# Patient Record
Sex: MALE | Race: WHITE | NOT HISPANIC OR LATINO | URBAN - METROPOLITAN AREA
[De-identification: names, ages, dates, MRNs, and addresses within clinical notes are randomized per-mention and may not be internally consistent; named-entity substitution may affect disease eponyms.]

---

## 2022-04-20 ENCOUNTER — TELEPHONE (OUTPATIENT)
Dept: TRANSPLANT | Facility: CLINIC | Age: 57
End: 2022-04-20
Payer: MEDICAID

## 2022-04-20 NOTE — TELEPHONE ENCOUNTER
Referral received from Adam Duran    Patient with alcohol cirrhosis with ascites requesting TIPs.   MELD 11  ICD-10:  K74.60  Referred for liver transplant for  EVALUATION.    Referral completed and forwarded to [facility].          Insurance:   PRIMARY:   ID:  Contact #     SECONDARY:   ID:  Contact #

## 2022-05-12 ENCOUNTER — TELEPHONE (OUTPATIENT)
Dept: TRANSPLANT | Facility: CLINIC | Age: 57
End: 2022-05-12
Payer: MEDICAID

## 2022-05-12 DIAGNOSIS — K74.60 HEPATIC CIRRHOSIS, UNSPECIFIED HEPATIC CIRRHOSIS TYPE, UNSPECIFIED WHETHER ASCITES PRESENT: ICD-10-CM

## 2022-05-12 DIAGNOSIS — Z76.82 ORGAN TRANSPLANT CANDIDATE: ICD-10-CM

## 2022-05-12 NOTE — TELEPHONE ENCOUNTER
----- Message from Jada Alston MA sent at 5/12/2022  3:26 PM CDT -----    ----- Message -----  From: Alana Elias  Sent: 5/12/2022   2:18 PM CDT  To: [facility] Clinical Staff    Type:  Patient Returning Call    Who Called pt sister Linda   Who Left Message for Patient: pt   Does the patient know what this is regarding?: pt sister was call to see if someone can give her a call about an appt   Would the patient rather a call back or a response via [facility]?  Call   Best Call Back Number: [phone]  Additional Information: call back

## 2022-05-17 ENCOUNTER — HOSPITAL ENCOUNTER (OUTPATIENT)
Dept: INTERVENTIONAL RADIOLOGY/VASCULAR | Facility: HOSPITAL | Age: 57
Discharge: HOME | End: 2022-05-17
Attending: INTERNAL MEDICINE
Payer: MEDICAID

## 2022-05-17 ENCOUNTER — OFFICE VISIT (OUTPATIENT)
Dept: TRANSPLANT | Facility: CLINIC | Age: 57
End: 2022-05-17
Payer: MEDICAID

## 2022-05-17 ENCOUNTER — HOSPITAL ENCOUNTER (INPATIENT)
Facility: HOSPITAL | Age: 57
LOS: 6 days | Discharge: HOME | End: 2022-05-25
Attending: EMERGENCY MEDICINE | Admitting: STUDENT IN AN ORGANIZED HEALTH CARE EDUCATION/TRAINING PROGRAM
Payer: MEDICAID

## 2022-05-17 VITALS
DIASTOLIC BLOOD PRESSURE: 73 MMHG | OXYGEN SATURATION: 95 % | WEIGHT: 216.06 LBS | BODY MASS INDEX: 29.26 KG/M2 | HEIGHT: 72 IN | HEART RATE: 75 BPM | RESPIRATION RATE: 16 BRPM | TEMPERATURE: 97 F | SYSTOLIC BLOOD PRESSURE: 109 MMHG

## 2022-05-17 VITALS
HEART RATE: 66 BPM | RESPIRATION RATE: 21 BRPM | DIASTOLIC BLOOD PRESSURE: 66 MMHG | OXYGEN SATURATION: 97 % | SYSTOLIC BLOOD PRESSURE: 104 MMHG

## 2022-05-17 DIAGNOSIS — K70.31 ALCOHOLIC CIRRHOSIS OF LIVER WITH ASCITES: ICD-10-CM

## 2022-05-17 DIAGNOSIS — K70.31 ALCOHOLIC CIRRHOSIS OF LIVER WITH ASCITES: Primary | ICD-10-CM

## 2022-05-17 DIAGNOSIS — K74.60 HEPATIC CIRRHOSIS, UNSPECIFIED HEPATIC CIRRHOSIS TYPE, UNSPECIFIED WHETHER ASCITES PRESENT: ICD-10-CM

## 2022-05-17 DIAGNOSIS — F10.21 ALCOHOL USE DISORDER, SEVERE, IN SUSTAINED REMISSION: Chronic | ICD-10-CM

## 2022-05-17 DIAGNOSIS — Z01.818 PRE-TRANSPLANT EVALUATION FOR CHRONIC LIVER DISEASE: ICD-10-CM

## 2022-05-17 DIAGNOSIS — K70.31 ASCITES DUE TO ALCOHOLIC CIRRHOSIS: Primary | ICD-10-CM

## 2022-05-17 DIAGNOSIS — R07.9 CHEST PAIN: ICD-10-CM

## 2022-05-17 DIAGNOSIS — F19.90 IVDU (INTRAVENOUS DRUG USER): ICD-10-CM

## 2022-05-17 DIAGNOSIS — K70.31 ASCITES DUE TO ALCOHOLIC CIRRHOSIS: ICD-10-CM

## 2022-05-17 DIAGNOSIS — Z76.82 ORGAN TRANSPLANT CANDIDATE: ICD-10-CM

## 2022-05-17 DIAGNOSIS — N17.9 AKI (ACUTE KIDNEY INJURY): ICD-10-CM

## 2022-05-17 PROBLEM — I85.00 ESOPHAGEAL VARICES WITHOUT BLEEDING: Status: ACTIVE | Noted: 2022-05-17

## 2022-05-17 PROBLEM — Z96.0 URETERAL STENT RETAINED: Status: ACTIVE | Noted: 2022-05-17

## 2022-05-17 PROBLEM — I10 HTN (HYPERTENSION): Status: ACTIVE | Noted: 2019-08-12

## 2022-05-17 PROBLEM — B18.1 CHRONIC HEPATITIS B WITH CIRRHOSIS: Status: ACTIVE | Noted: 2019-06-05

## 2022-05-17 LAB
ALBUMIN SERPL BCP-MCNC: 2.4 G/DL (ref 3.5–5.2)
ALP SERPL-CCNC: 137 U/L (ref 55–135)
ALT SERPL W/O P-5'-P-CCNC: 32 U/L (ref 10–44)
AMMONIA PLAS-SCNC: 70 UMOL/L (ref 10–50)
ANION GAP SERPL CALC-SCNC: 9 MMOL/L (ref 8–16)
APPEARANCE FLD: NORMAL
APTT BLDCRRT: 27.1 SEC (ref 21–32)
AST SERPL-CCNC: 51 U/L (ref 10–40)
BASOPHILS # BLD AUTO: 0.06 K/UL (ref 0–0.2)
BASOPHILS NFR BLD: 0.7 % (ref 0–1.9)
BILIRUB SERPL-MCNC: 1.2 MG/DL (ref 0.1–1)
BODY FLD TYPE: NORMAL
BUN SERPL-MCNC: 36 MG/DL (ref 6–20)
CALCIUM SERPL-MCNC: 8.1 MG/DL (ref 8.7–10.5)
CHLORIDE SERPL-SCNC: 100 MMOL/L (ref 95–110)
CO2 SERPL-SCNC: 22 MMOL/L (ref 23–29)
COLOR FLD: NORMAL
CREAT SERPL-MCNC: 1.7 MG/DL (ref 0.5–1.4)
CTP QC/QA: YES
DIFFERENTIAL METHOD: ABNORMAL
EOSINOPHIL # BLD AUTO: 0.2 K/UL (ref 0–0.5)
EOSINOPHIL NFR BLD: 2 % (ref 0–8)
ERYTHROCYTE [DISTWIDTH] IN BLOOD BY AUTOMATED COUNT: 14.4 % (ref 11.5–14.5)
EST. GFR  (AFRICAN AMERICAN): 51.3 ML/MIN/1.73 M^2
EST. GFR  (NON AFRICAN AMERICAN): 44.4 ML/MIN/1.73 M^2
GLUCOSE SERPL-MCNC: 174 MG/DL (ref 70–110)
HCT VFR BLD AUTO: 39.4 % (ref 40–54)
HGB BLD-MCNC: 13.2 G/DL (ref 14–18)
IMM GRANULOCYTES # BLD AUTO: 0.17 K/UL (ref 0–0.04)
IMM GRANULOCYTES NFR BLD AUTO: 2 % (ref 0–0.5)
INR PPP: 1.3 (ref 0.8–1.2)
LYMPHOCYTES # BLD AUTO: 1.2 K/UL (ref 1–4.8)
LYMPHOCYTES NFR BLD: 13.9 % (ref 18–48)
LYMPHOCYTES NFR FLD MANUAL: 61 %
MCH RBC QN AUTO: 30.1 PG (ref 27–31)
MCHC RBC AUTO-ENTMCNC: 33.5 G/DL (ref 32–36)
MCV RBC AUTO: 90 FL (ref 82–98)
MESOTHL CELL NFR FLD MANUAL: 3 %
MONOCYTES # BLD AUTO: 1.4 K/UL (ref 0.3–1)
MONOCYTES NFR BLD: 16.5 % (ref 4–15)
MONOS+MACROS NFR FLD MANUAL: 6 %
NEUTROPHILS # BLD AUTO: 5.6 K/UL (ref 1.8–7.7)
NEUTROPHILS NFR BLD: 64.9 % (ref 38–73)
NEUTROPHILS NFR FLD MANUAL: 30 %
NRBC BLD-RTO: 0 /100 WBC
PLATELET # BLD AUTO: 132 K/UL (ref 150–450)
PMV BLD AUTO: 10.3 FL (ref 9.2–12.9)
POTASSIUM SERPL-SCNC: 3.8 MMOL/L (ref 3.5–5.1)
PROT SERPL-MCNC: 6.1 G/DL (ref 6–8.4)
PROTHROMBIN TIME: 13 SEC (ref 9–12.5)
RBC # BLD AUTO: 4.38 M/UL (ref 4.6–6.2)
SARS-COV-2 RDRP RESP QL NAA+PROBE: NEGATIVE
SODIUM SERPL-SCNC: 131 MMOL/L (ref 136–145)
WBC # BLD AUTO: 8.55 K/UL (ref 3.9–12.7)
WBC # FLD: 292 /CU MM

## 2022-05-17 PROCEDURE — 3078F DIAST BP <80 MM HG: CPT | Mod: CPTII,TXP,, | Performed by: INTERNAL MEDICINE

## 2022-05-17 PROCEDURE — 25000003 PHARM REV CODE 250: Mod: NTX | Performed by: STUDENT IN AN ORGANIZED HEALTH CARE EDUCATION/TRAINING PROGRAM

## 2022-05-17 PROCEDURE — 96365 THER/PROPH/DIAG IV INF INIT: CPT

## 2022-05-17 PROCEDURE — 99220 PR INITIAL OBSERVATION CARE,LEVL III: ICD-10-PCS | Mod: NTX,,, | Performed by: HOSPITALIST

## 2022-05-17 PROCEDURE — 89051 BODY FLUID CELL COUNT: CPT | Mod: TXP | Performed by: INTERNAL MEDICINE

## 2022-05-17 PROCEDURE — 87389 HIV-1 AG W/HIV-1&-2 AB AG IA: CPT | Mod: NTX | Performed by: EMERGENCY MEDICINE

## 2022-05-17 PROCEDURE — G0378 HOSPITAL OBSERVATION PER HR: HCPCS | Mod: NTX

## 2022-05-17 PROCEDURE — 3008F BODY MASS INDEX DOCD: CPT | Mod: CPTII,TXP,, | Performed by: INTERNAL MEDICINE

## 2022-05-17 PROCEDURE — 87070 CULTURE OTHR SPECIMN AEROBIC: CPT | Mod: NTX | Performed by: INTERNAL MEDICINE

## 2022-05-17 PROCEDURE — 49083 ABD PARACENTESIS W/IMAGING: CPT | Mod: TXP | Performed by: RADIOLOGY

## 2022-05-17 PROCEDURE — 85610 PROTHROMBIN TIME: CPT | Mod: 91,NTX | Performed by: NURSE PRACTITIONER

## 2022-05-17 PROCEDURE — 63600175 PHARM REV CODE 636 W HCPCS: Mod: JG,UD,TXP | Performed by: PHYSICIAN ASSISTANT

## 2022-05-17 PROCEDURE — 82140 ASSAY OF AMMONIA: CPT | Mod: NTX | Performed by: NURSE PRACTITIONER

## 2022-05-17 PROCEDURE — 85025 COMPLETE CBC W/AUTO DIFF WBC: CPT | Mod: 91,NTX | Performed by: NURSE PRACTITIONER

## 2022-05-17 PROCEDURE — 80053 COMPREHEN METABOLIC PANEL: CPT | Mod: 91,NTX | Performed by: NURSE PRACTITIONER

## 2022-05-17 PROCEDURE — 85730 THROMBOPLASTIN TIME PARTIAL: CPT | Mod: NTX | Performed by: NURSE PRACTITIONER

## 2022-05-17 PROCEDURE — 99285 PR EMERGENCY DEPT VISIT,LEVEL V: ICD-10-PCS | Mod: CS,,, | Performed by: NURSE PRACTITIONER

## 2022-05-17 PROCEDURE — 25000003 PHARM REV CODE 250: Mod: NTX | Performed by: PHYSICIAN ASSISTANT

## 2022-05-17 PROCEDURE — 96367 TX/PROPH/DG ADDL SEQ IV INF: CPT

## 2022-05-17 PROCEDURE — 99999 PR PBB SHADOW E&M-EST. PATIENT-LVL III: ICD-10-PCS | Mod: PBBFAC,TXP,, | Performed by: INTERNAL MEDICINE

## 2022-05-17 PROCEDURE — 3078F PR MOST RECENT DIASTOLIC BLOOD PRESSURE < 80 MM HG: ICD-10-PCS | Mod: CPTII,TXP,, | Performed by: INTERNAL MEDICINE

## 2022-05-17 PROCEDURE — 99285 EMERGENCY DEPT VISIT HI MDM: CPT | Mod: 25,27

## 2022-05-17 PROCEDURE — 99285 EMERGENCY DEPT VISIT HI MDM: CPT | Mod: CS,,, | Performed by: NURSE PRACTITIONER

## 2022-05-17 PROCEDURE — C1729 CATH, DRAINAGE: HCPCS | Mod: NTX

## 2022-05-17 PROCEDURE — 3008F PR BODY MASS INDEX (BMI) DOCUMENTED: ICD-10-PCS | Mod: CPTII,TXP,, | Performed by: INTERNAL MEDICINE

## 2022-05-17 PROCEDURE — 99213 OFFICE O/P EST LOW 20 MIN: CPT | Mod: PBBFAC,TXP,25 | Performed by: INTERNAL MEDICINE

## 2022-05-17 PROCEDURE — 99205 PR OFFICE/OUTPT VISIT, NEW, LEVL V, 60-74 MIN: ICD-10-PCS | Mod: S$PBB,TXP,, | Performed by: INTERNAL MEDICINE

## 2022-05-17 PROCEDURE — P9047 ALBUMIN (HUMAN), 25%, 50ML: HCPCS | Mod: JG,UD,NTX

## 2022-05-17 PROCEDURE — 3074F PR MOST RECENT SYSTOLIC BLOOD PRESSURE < 130 MM HG: ICD-10-PCS | Mod: CPTII,TXP,, | Performed by: INTERNAL MEDICINE

## 2022-05-17 PROCEDURE — 96372 THER/PROPH/DIAG INJ SC/IM: CPT

## 2022-05-17 PROCEDURE — 99999 PR PBB SHADOW E&M-EST. PATIENT-LVL III: CPT | Mod: PBBFAC,TXP,, | Performed by: INTERNAL MEDICINE

## 2022-05-17 PROCEDURE — 3074F SYST BP LT 130 MM HG: CPT | Mod: CPTII,TXP,, | Performed by: INTERNAL MEDICINE

## 2022-05-17 PROCEDURE — U0002 COVID-19 LAB TEST NON-CDC: HCPCS | Mod: NTX | Performed by: NURSE PRACTITIONER

## 2022-05-17 PROCEDURE — P9047 ALBUMIN (HUMAN), 25%, 50ML: HCPCS | Mod: JG,UD,TXP | Performed by: PHYSICIAN ASSISTANT

## 2022-05-17 PROCEDURE — 63600175 PHARM REV CODE 636 W HCPCS: Mod: JG,UD,NTX

## 2022-05-17 PROCEDURE — 87075 CULTR BACTERIA EXCEPT BLOOD: CPT | Mod: NTX | Performed by: INTERNAL MEDICINE

## 2022-05-17 PROCEDURE — 25000003 PHARM REV CODE 250: Mod: NTX

## 2022-05-17 PROCEDURE — 99205 OFFICE O/P NEW HI 60 MIN: CPT | Mod: S$PBB,TXP,, | Performed by: INTERNAL MEDICINE

## 2022-05-17 PROCEDURE — 49083 ABD PARACENTESIS W/IMAGING: CPT | Mod: TXP,,, | Performed by: PHYSICIAN ASSISTANT

## 2022-05-17 PROCEDURE — 49083 IR PARACENTESIS WITH IMAGING: ICD-10-PCS | Mod: TXP,,, | Performed by: PHYSICIAN ASSISTANT

## 2022-05-17 PROCEDURE — 99220 PR INITIAL OBSERVATION CARE,LEVL III: CPT | Mod: NTX,,, | Performed by: HOSPITALIST

## 2022-05-17 RX ORDER — ALBUMIN HUMAN 250 G/1000ML
25 SOLUTION INTRAVENOUS ONCE
Status: DISCONTINUED | OUTPATIENT
Start: 2022-05-17 | End: 2022-05-17

## 2022-05-17 RX ORDER — FLUTICASONE FUROATE AND VILANTEROL 200; 25 UG/1; UG/1
1 POWDER RESPIRATORY (INHALATION) DAILY
COMMUNITY
Start: 2022-05-12

## 2022-05-17 RX ORDER — METHOCARBAMOL 500 MG/1
500 TABLET, FILM COATED ORAL ONCE
Status: COMPLETED | OUTPATIENT
Start: 2022-05-17 | End: 2022-05-17

## 2022-05-17 RX ORDER — SODIUM CHLORIDE 0.9 % (FLUSH) 0.9 %
10 SYRINGE (ML) INJECTION EVERY 12 HOURS PRN
Status: DISCONTINUED | OUTPATIENT
Start: 2022-05-17 | End: 2022-05-25 | Stop reason: HOSPADM

## 2022-05-17 RX ORDER — LACTULOSE 10 G/15ML
15 SOLUTION ORAL 3 TIMES DAILY
Status: DISCONTINUED | OUTPATIENT
Start: 2022-05-17 | End: 2022-05-18

## 2022-05-17 RX ORDER — TENOFOVIR DISOPROXIL FUMARATE 300 MG/1
300 TABLET, FILM COATED ORAL
COMMUNITY
Start: 2022-01-28 | End: 2023-01-28

## 2022-05-17 RX ORDER — LACTULOSE 10 G/15ML
30 SOLUTION ORAL; RECTAL 2 TIMES DAILY
COMMUNITY
Start: 2022-05-12

## 2022-05-17 RX ORDER — ALBUMIN HUMAN 250 G/1000ML
SOLUTION INTRAVENOUS
Status: COMPLETED | OUTPATIENT
Start: 2022-05-17 | End: 2022-05-17

## 2022-05-17 RX ORDER — MIDODRINE HYDROCHLORIDE 5 MG/1
10 TABLET ORAL
Status: DISCONTINUED | OUTPATIENT
Start: 2022-05-18 | End: 2022-05-18

## 2022-05-17 RX ORDER — IBUPROFEN 200 MG
24 TABLET ORAL
Status: DISCONTINUED | OUTPATIENT
Start: 2022-05-17 | End: 2022-05-25 | Stop reason: HOSPADM

## 2022-05-17 RX ORDER — TALC
6 POWDER (GRAM) TOPICAL NIGHTLY PRN
Status: CANCELLED | OUTPATIENT
Start: 2022-05-17

## 2022-05-17 RX ORDER — IBUPROFEN 200 MG
16 TABLET ORAL
Status: DISCONTINUED | OUTPATIENT
Start: 2022-05-17 | End: 2022-05-25 | Stop reason: HOSPADM

## 2022-05-17 RX ORDER — ALBUMIN HUMAN 250 G/1000ML
100 SOLUTION INTRAVENOUS ONCE
Status: DISCONTINUED | OUTPATIENT
Start: 2022-05-17 | End: 2022-05-17

## 2022-05-17 RX ORDER — ALBUMIN HUMAN 250 G/1000ML
SOLUTION INTRAVENOUS
Status: DISCONTINUED
Start: 2022-05-17 | End: 2022-05-17 | Stop reason: HOSPADM

## 2022-05-17 RX ORDER — MIDODRINE HYDROCHLORIDE 5 MG/1
5 TABLET ORAL
Status: DISCONTINUED | OUTPATIENT
Start: 2022-05-17 | End: 2022-05-17

## 2022-05-17 RX ORDER — LIDOCAINE HYDROCHLORIDE 10 MG/ML
INJECTION INFILTRATION; PERINEURAL CODE/TRAUMA/SEDATION MEDICATION
Status: COMPLETED | OUTPATIENT
Start: 2022-05-17 | End: 2022-05-17

## 2022-05-17 RX ORDER — OCTREOTIDE ACETATE 100 UG/ML
100 INJECTION, SOLUTION INTRAVENOUS; SUBCUTANEOUS EVERY 8 HOURS
Status: DISCONTINUED | OUTPATIENT
Start: 2022-05-17 | End: 2022-05-18

## 2022-05-17 RX ORDER — GLUCAGON 1 MG
1 KIT INJECTION
Status: DISCONTINUED | OUTPATIENT
Start: 2022-05-17 | End: 2022-05-25 | Stop reason: HOSPADM

## 2022-05-17 RX ORDER — FLUTICASONE FUROATE AND VILANTEROL 100; 25 UG/1; UG/1
1 POWDER RESPIRATORY (INHALATION) DAILY
Status: DISCONTINUED | OUTPATIENT
Start: 2022-05-18 | End: 2022-05-18

## 2022-05-17 RX ORDER — ALBUMIN HUMAN 250 G/1000ML
75 SOLUTION INTRAVENOUS ONCE
Status: COMPLETED | OUTPATIENT
Start: 2022-05-17 | End: 2022-05-17

## 2022-05-17 RX ORDER — OCTREOTIDE ACETATE 100 UG/ML
50 INJECTION, SOLUTION INTRAVENOUS; SUBCUTANEOUS EVERY 12 HOURS
Status: DISCONTINUED | OUTPATIENT
Start: 2022-05-17 | End: 2022-05-17

## 2022-05-17 RX ORDER — TENOFOVIR DISOPROXIL FUMARATE 300 MG/1
300 TABLET, FILM COATED ORAL DAILY
Status: DISCONTINUED | OUTPATIENT
Start: 2022-05-18 | End: 2022-05-25 | Stop reason: HOSPADM

## 2022-05-17 RX ORDER — NALOXONE HCL 0.4 MG/ML
0.02 VIAL (ML) INJECTION
Status: DISCONTINUED | OUTPATIENT
Start: 2022-05-17 | End: 2022-05-25 | Stop reason: HOSPADM

## 2022-05-17 RX ORDER — PANTOPRAZOLE SODIUM 40 MG/1
40 TABLET, DELAYED RELEASE ORAL DAILY
Status: DISCONTINUED | OUTPATIENT
Start: 2022-05-18 | End: 2022-05-25 | Stop reason: HOSPADM

## 2022-05-17 RX ORDER — SODIUM CHLORIDE 0.9 % (FLUSH) 0.9 %
10 SYRINGE (ML) INJECTION
Status: CANCELLED | OUTPATIENT
Start: 2022-05-17

## 2022-05-17 RX ADMIN — ALBUMIN (HUMAN) 25 G: 25 SOLUTION INTRAVENOUS at 11:05

## 2022-05-17 RX ADMIN — LACTULOSE 15 G: 20 SOLUTION ORAL at 10:05

## 2022-05-17 RX ADMIN — MIDODRINE HYDROCHLORIDE 5 MG: 5 TABLET ORAL at 08:05

## 2022-05-17 RX ADMIN — METHOCARBAMOL 500 MG: 500 TABLET ORAL at 08:05

## 2022-05-17 RX ADMIN — OCTREOTIDE ACETATE 100 MCG: 100 INJECTION, SOLUTION INTRAVENOUS; SUBCUTANEOUS at 10:05

## 2022-05-17 RX ADMIN — LIDOCAINE HYDROCHLORIDE 5 ML: 10 INJECTION, SOLUTION INFILTRATION; PERINEURAL at 11:05

## 2022-05-17 RX ADMIN — CEFTRIAXONE 1 G: 1 INJECTION, SOLUTION INTRAVENOUS at 06:05

## 2022-05-17 RX ADMIN — ALBUMIN (HUMAN) 75 G: 12.5 SOLUTION INTRAVENOUS at 10:05

## 2022-05-17 NOTE — PLAN OF CARE
Patient presents for paracentesis. Patient awake and alert. Tachypneic, appears very uncomfortable. Allergies reviewed. Waiting for eval and consent.  Vitals:    05/17/22 1056   BP: 134/81   Pulse: 70   Resp: (!) 28

## 2022-05-17 NOTE — LETTER
May 17, 2022        Adam Merchant  [address] [facility] [place] [address]  Phone: [phone]  Fax: [phone]             [facility]  [address]  [facility] [address]  Phone: [phone]   Patient: Nic Munoz   MR Number: [id number]   YOB: 1965   Date of Visit: 5/17/2022       Dear Dr. Adam Merchant    Thank you for referring Nic Munoz to me for evaluation. Attached you will find relevant portions of my assessment and plan of care.    If you have questions, please do not hesitate to call me. I look forward to following Nic Munoz along with you.    Sincerely,    Anjali Yan MD    Enclosure    If you would like to receive this communication electronically, please contact [email or web address] or [phone] to request [employer] access.    [employer] is a tool which provides read-only access to select patient information with whom you have a relationship. Its easy to use and provides real time access to review your patients record including encounter summaries, notes, results, and demographic information.    If you feel you have received this communication in error or would no longer like to receive these types of communications, please e-mail [email or web address]

## 2022-05-17 NOTE — H&P
Radiology History & Physical      SUBJECTIVE:     Chief Complaint: abdominal distention    History of Present Illness:  Nic Munoz is a 56 y.o. male who presents for ultrasound guided paracentesis  No past medical history on file.  No past surgical history on file.    Home Meds:   Prior to Admission medications    Not on File     Anticoagulants/Antiplatelets: no anticoagulation    Allergies: Review of patient's allergies indicates:  No Known Allergies  Sedation History:  no adverse reactions    Review of Systems:   Hematological: no known coagulopathies  Respiratory: no shortness of breath  Cardiovascular: no chest pain  Gastrointestinal: no abdominal pain  Genito-Urinary: no dysuria  Musculoskeletal: negative  Neurological: no TIA or stroke symptoms         OBJECTIVE:     Vital Signs (Most Recent)       Physical Exam:  ASA: 2  Mallampati: n/a    General: no acute distress  Mental Status: alert and oriented to person, place and time  HEENT: normocephalic, atraumatic  Chest: unlabored breathing  Heart: regular heart rate  Abdomen: distended  Extremity: moves all extremities    ASSESSMENT/PLAN:     Sedation Plan: local  Patient will undergo ultrasound guided paracentesis.    Mamie Ling PA-C  Interventional Radiology  Clinic [phone]

## 2022-05-17 NOTE — PROCEDURES
Radiology Post-Procedure Note    Pre Op Diagnosis: Ascites  Post Op Diagnosis: Same    Procedure: Ultrasound Guided Paracentesis    Procedure performed by: Mamie Ling PA-C    Written Informed Consent Obtained: Yes  Specimen Removed: YES chylous  Estimated Blood Loss: Minimal    Findings:   Successful paracentesis. LLQ.  Albumin administered PRN per protocol.    Patient tolerated procedure well.    Mamie Ling PA-C  Interventional Radiology  Clinic [phone]

## 2022-05-17 NOTE — ASSESSMENT & PLAN NOTE
"57 y/o M hx of alcoholic cirrhosis, Hep B cirrhosis, HTN presents from hepatology clinic with decompensated cirrhosis and for tx eval. Pt underwent paracentesis in clinic with labs sent. Patient afebrile, HDS and in no distress on admission. Hepatology consulted, appreciate their input.     Plan:  -- current meld 20  -- hepatology consulted on admission  -- lactulose 15 TID; titrate to 3 BM per day  -- HRS management with octreotide, midodrine, albumin as below under "BC"  -- currently holding lasix, spironolactone   -- Ceftriaxone for SBP prophylaxis  -- f/u para labs    "

## 2022-05-17 NOTE — ASSESSMENT & PLAN NOTE
Patient reports history of HTN, not currently on any antihypertensive medications. Patient normotensive in ED.

## 2022-05-17 NOTE — ASSESSMENT & PLAN NOTE
-- continue Tenofovir 300 mg qd  -- per hepatology tx note, can consider transition to Vemlidy  -- will f/u hepatology recs

## 2022-05-17 NOTE — PLAN OF CARE
Paracentesis done. Removed 5000 ml thick pink ascites. Albumin 25g administered. Patient awake and alert, no distress noted, respirations even and unlabored.   Vitals:    05/17/22 1154   BP: 104/66   Pulse: 66   Resp: (!) 21

## 2022-05-17 NOTE — ED PROVIDER NOTES
Source of History:  Patient, chart    Chief complaint:  Referral (Was sent by transplant  From clinic for paracentesis. )      HPI:  Nic Munoz is a 56 y.o. male presenting with abdominal distension.  Patient was seen by liver transplant this morning and sent for a paracentesis and then sent to the ED for admission.  Patient had 5000 mL of thick pink ascites removed from abdomen during paracentesis this morning.  Patient denies any complaints.      This is the extent to the patients complaints today here in the emergency department.    ROS: As per HPI and below:  Constitutional: No fever.  No chills.  Eyes: No visual changes.  ENT: No sore throat. No ear pain    Cardiovascular: No chest pain.  Respiratory:  Positive for chronic shortness of breath.  GI:  Positive for abdominal distension.    Genitourinary: No abnormal urination.  Neurologic: No headache. No focal weakness.  No numbness.  MSK: no back pain.  Integument: No rashes or lesions.  Positive for jaundice  Hematologic: No easy bruising.  Endocrine: No excessive thirst or urination.    Review of patient's allergies indicates:  No Known Allergies    PMH:  As per HPI and below:  No past medical history on file.  No past surgical history on file.         Physical Exam:    /70 (BP Location: Left arm, Patient Position: Sitting)   Pulse 70   Temp 97.9 °F (36.6 °C) (Oral)   Resp 18   Wt 98 kg (216 lb)   SpO2 (!) 92%   BMI 29.29 kg/m²   Nursing note and vital signs reviewed.  Constitutional: No acute distress.  Nontoxic.  Vital signs stable.  Eyes: No conjunctival injection.  Extraocular muscles are intact.  ENT: Oropharynx clear.  Normal phonation.  Cardiovascular: Regular rate and rhythm.  No murmurs. No gallops. No rubs  Respiratory:  Diminished to auscultation bilaterally.  Good air movement.  No wheezes.  No rhonchi. No rales. No accessory muscle use.  Abdomen:  Positive for distension.  Abdomen firm and nontender.  Musculoskeletal: Good range  of motion all joints.  No deformities.  Neck supple.  No meningismus.  Skin: No rashes seen.  Decreased skin turgor.  No abrasions.  No ecchymoses.  Neuro: alert and oriented x3,  no focal neurological deficits.  Psych: Appropriate, conversant    Labs that have been ordered have been independently reviewed and interpreted by myself.    I decided to obtain the patient's medical records.  Summary of Medical Records:  Seen and evaluated in liver transplant earlier today.  Had paracentesis completed that took off 5000 mL of pink fluid.  Hector score 20.  To sick for tips procedure.    MDM/ Differential Dx:   Emergent evaluation of a 55 yo male presenting for liver failure, BC and cirrhosis with ascites.  Patient had a paracentesis this morning after a transplant visit.  Patient was advised come to the ED for admission.  Patient has been requiring paracentesis is approximately read 10 days for the past 8 months.  Patient is not establish with transplant and has not been evaluated for liver transplant.  Patient denies any complaints on exam.  On exam pt is A&Ox3. VSS. Nonfebrile and nontoxic appearing. Breath sounds diminished bilaterally. Mucous membranes pink and moist.  Jaundice noted. Abdomen distended and firm but nontender. No rebound or guarding appreciated on exam.   BS WNL.  Pt speaking in full sentences.  Steady gait appreciated. Cap refill > 3 seconds.      Differential diagnoses include but are not limited to cirrhosis, liver failure, ascites, jaundice, others.    I will get labs and discussed case with hospital medicine.  I discussed this case with my supervising physician.  ED Course as of 05/17/22 1557   Tue May 17, 2022   1551 CBC unremarkable.  No leukocytosis noted.  H&H stable 13.2 and 39.4. [RZ]   1556 Discussed case with hospital medicine.  Will admit for further evaluation and treatment of cirrhosis and ascites.  Patient updated on plan of care.  All questions and concerns addressed. [RZ]      ED  Course User Index  [RZ] Tmeitope Retana NP               Diagnostic Impression:    1. Alcoholic cirrhosis of liver with ascites    2. BC (acute kidney injury)         ED Disposition Condition    Observation                     Temitope Retana NP  05/17/22 6528

## 2022-05-17 NOTE — PROGRESS NOTES
"   [facility] Consultation Note    Reason for Consult:  The primary encounter diagnosis was Ascites due to alcoholic cirrhosis. Diagnoses of Hepatic cirrhosis, unspecified hepatic cirrhosis type, unspecified whether ascites present, Organ transplant candidate, and Alcoholic cirrhosis of liver with ascites were also pertinent to this visit.    PCP: Primary Doctor No   [address] [place] / BRYNN AKERS*    HPI:  This is a 56 y.o. male here for evaluation of: cirrhosis, meld 11, TIPS    Patient here with: Ex wife, Patricia Cifuentes  Primary caregiver: sister, Linda Guzman, lives 4-5 miles from patient.  Back-up could be ex-wife.  Also has a brother, Vamsi Munoz, lives 1.5 miles from patient.      Post-hosp discharge? Yes, [facility], post-paracentesis (total 12 times over 7 months), last one on 5/12/22, two days before discharge   on 5/14/22.     Duration of liver disease: 5 yrs.   Etiology: alcohol.  Quit 3.5 - 4 yrs ago.   Had inpatient rehab at [place], [place], inpatient 30 days, 5 yrs ago.  Failed after rehab, drank for another 1- 1.5 yrs after completing rehab.     Main problem during above admission 5/10/22 : large abdomen (for 8 months), no infection in the fluid.  Really big abd for 5 days.  Also, had ammonia elevation (first episode).  Took lactulose, but not helping.  Has never taken the pink pill "xifaxan". Has insurance, nobody tried to get him xifax.      Etiology of liver disease:  Alcohol: Drank daily, 2-3/day, beer mostly x 14-15 yrs.     Hepatitis C: known x 5 yrs, treated about 8 months ago, and cleared the virus, as far as he knows.   Hepatitis B: diagnosed with hep C, about 5 yrs ago.  On anti-HBV med   Fatty liver: No        Complications of liver disease:   cirrhosis,   Ascites yes  Edema yes.   Encephalopathy yes   GI bleed: light colored stools with red streaks - 1 week ago,     Long term follow-up:  Rehab: failed once  AA: attended x 8 years off " and on.   Paracentesis: about 12 times over 8 months.   HCC surveillance: to be started with US abd and AFP every 6 months.     MELD-Na score: 20 at 5/17/2022  9:16 AM  MELD score: 16 at 5/17/2022  9:16 AM  Calculated from:  Serum Creatinine: 1.8 mg/dL at 5/17/2022  9:16 AM  Serum Sodium: 132 mmol/L at 5/17/2022  9:16 AM  Total Bilirubin: 1.5 mg/dL at 5/17/2022  9:16 AM  INR(ratio): 1.2 at 5/17/2022  9:16 AM  Age: 56 years      Risk factors for liver disease:  No jaundice  No transfusions  No IVDU  Did not snort cocaine or similar agents  Did not live with anyone with hepatitis B or C  Sexual partner not tested  No hepatotoxic medications  No exposure to industrial toxins  Alcohol: As above.       ROS:  Constitutional: No fevers, chills, weight changes, fatigue  ENT: No allergies, nosebleeds,   CV: No chest pain  Pulm: No cough, shortness of breath  Ophtho: No vision changes  GI/Liver: see HPI  Derm: No rash, itching  Heme: No swollen glands, bruising  MSK: No joint pains, joint swelling  : No dysuria, hematuria, decrease in urine output  Endo: No hot or cold intolerance  Neuro: No confusion, disorientation, difficulty with sleep, memory, concentration, syncope, seizure  Psych: No anxiety, depression    Medical History:  has no past medical history on file.    Surgical History:  has no past surgical history on file.    Family History: family history is not on file..     Social History:      Review of patient's allergies indicates:  No Known Allergies        Objective Findings:    Vital Signs:  /73 (BP Location: Right arm, Patient Position: Sitting, BP Method: Large (Automatic))   Pulse 75   Temp 97.2 °F (36.2 °C) (Temporal)   Resp 16   Ht 6' (1.829 m)   Wt 98 kg (216 lb 0.8 oz)   SpO2 95%   BMI 29.30 kg/m²   Body mass index is 29.3 kg/m².    Physical Exam:  General Appearance: Well appearing in no acute distress  Head:   Normocephalic, without obvious abnormality  Eyes:    No scleral icterus,  EOMI  ENT: Neck supple, Lips, mucosa, and tongue normal; teeth and gums normal  Lungs: CTA bilaterally in anterior and posterior fields, no wheezes, no crackles.  Heart:  Regular rate and rhythm, S1, S2 normal, no murmurs heard  Abdomen: large, tence, non tender. Has large ascites.   Extremities: Has edema  Skin: No rash  Neurologic: alert, oriented x 3. No asterixis      Labs:  Lab Results   Component Value Date    WBC 12.35 05/17/2022    HGB 14.9 05/17/2022    HCT 45.1 05/17/2022     05/17/2022    INR 1.2 05/17/2022    CREATININE 1.8 (H) 05/17/2022    BUN 37 (H) 05/17/2022    BILITOT 1.5 (H) 05/17/2022    ALT 41 05/17/2022    AST 62 (H) 05/17/2022    ALKPHOS 180 (H) 05/17/2022     (L) 05/17/2022    K 4.1 05/17/2022    CL 97 05/17/2022    CO2 27 05/17/2022    AFP 2.5 05/17/2022       Imaging:       Endoscopy:      Assessment:  1. Ascites due to alcoholic cirrhosis    2. Hepatic cirrhosis, unspecified hepatic cirrhosis type, unspecified whether ascites present    3. Organ transplant candidate    4. Alcoholic cirrhosis of liver with ascites      -  Alcoholic cirrhosis with ascites, edema, needing para every 10 days, for last 8 months.  Sent here for TIPS, but labs show MELD 20, not a candidate for TIPS at this time.  If MELD improves, may become TIPS candidate.   -  Had one episode of HE last week.   -  Has Large ascites, with shortness of breath, needs drainage today. Will r/o SBP.  -  Labs show BC, with creatinine 1.8, and MELD 20.  On lasix 80 mg BID and spirono 200 mg daily.  Needs to be off these for now, admit for eval and treatment of HRS.     -  Needs Paracentesis today, then go to the ER for evaluation, admission, and look into starting liver  transplant eval for MELD 20.   -  Too sick for TIPS procedure.   -  Has Hep B on tenofovir 300 mg daily for about 8 months. Could be switched to vemlidy.   - Hep C treated and cured per patient.    No follow-ups on file.      Order summary:  Orders Placed  This Encounter   Procedures    Culture, Body Fluid - Bactec    IR Paracentesis with Imaging    WBC & Diff,Body Fluid Ascites       Thank you so much for allowing me to participate in the care of Nic Yan MD

## 2022-05-17 NOTE — SUBJECTIVE & OBJECTIVE
No past medical history on file.    No past surgical history on file.    Review of patient's allergies indicates:  No Known Allergies    Current Facility-Administered Medications on File Prior to Encounter   Medication    albumin human 25% 25 % bottle    [COMPLETED] albumin human 25% bottle    [COMPLETED] LIDOcaine HCL 10 mg/ml (1%) injection     Current Outpatient Medications on File Prior to Encounter   Medication Sig    fluticasone furoate-vilanteroL (BREO) 200-25 mcg/dose DsDv diskus inhaler Inhale 1 puff into the lungs once daily.    tenofovir disoproxil fumarate (VIREAD) 300 mg Tab Take 300 mg by mouth.    lactulose (CHRONULAC) 10 gram/15 mL solution Take 30 mLs by mouth 2 (two) times daily.     Family History    None       Tobacco Use    Smoking status: Not on file    Smokeless tobacco: Not on file   Substance and Sexual Activity    Alcohol use: Not on file    Drug use: Not on file    Sexual activity: Not on file     Review of Systems   Constitutional:  Positive for fatigue. Negative for chills, diaphoresis and fever.   HENT:  Negative for congestion and sore throat.    Respiratory:  Negative for cough and shortness of breath.    Cardiovascular:  Positive for leg swelling. Negative for chest pain and palpitations.   Gastrointestinal:  Positive for abdominal distention and abdominal pain. Negative for constipation, diarrhea, nausea and vomiting.   Genitourinary:  Negative for dysuria and hematuria.   Musculoskeletal:  Negative for arthralgias and myalgias.   Skin:  Positive for color change. Negative for rash and wound.   Neurological:  Negative for dizziness, seizures, syncope, weakness and headaches.   Psychiatric/Behavioral:  Negative for confusion. The patient is not nervous/anxious.    Objective:     Vital Signs (Most Recent):  Temp: 98.1 °F (36.7 °C) (05/17/22 1802)  Pulse: 78 (05/17/22 1802)  Resp: 12 (05/17/22 1802)  BP: 113/74 (05/17/22 1802)  SpO2: 97 % (05/17/22 1802) Vital Signs (24h Range):  Temp:   [97.2 °F (36.2 °C)-98.1 °F (36.7 °C)] 98.1 °F (36.7 °C)  Pulse:  [66-78] 78  Resp:  [12-28] 12  SpO2:  [92 %-97 %] 97 %  BP: (104-134)/(66-81) 113/74     Weight: 98 kg (216 lb)  Body mass index is 29.29 kg/m².    Physical Exam  Vitals and nursing note reviewed.   Constitutional:       General: He is not in acute distress.     Appearance: He is ill-appearing. He is not toxic-appearing or diaphoretic.      Comments: Pleasant, ill-appearing male lying in bed in NAD.   HENT:      Head: Normocephalic and atraumatic.      Nose: Nose normal. No congestion.      Mouth/Throat:      Mouth: Mucous membranes are dry.      Pharynx: Oropharynx is clear.      Comments: Poor dentition  Eyes:      General: Scleral icterus present.      Extraocular Movements: Extraocular movements intact.      Pupils: Pupils are equal, round, and reactive to light.   Cardiovascular:      Rate and Rhythm: Normal rate and regular rhythm.      Pulses: Normal pulses.      Heart sounds: Normal heart sounds. No murmur heard.    No friction rub. No gallop.   Pulmonary:      Effort: Pulmonary effort is normal.      Breath sounds: Normal breath sounds.   Abdominal:      Comments: Abdomen distended. Nontender to palpation. Normoactive bowel sounds.    Musculoskeletal:      Cervical back: Normal range of motion and neck supple.      Right lower leg: Edema (3+ pitting) present.      Left lower leg: Edema (3+ pitting) present.   Skin:     General: Skin is warm and dry.      Coloration: Skin is jaundiced.   Neurological:      General: No focal deficit present.      Mental Status: He is alert and oriented to person, place, and time.      Comments: No asterixis or tremors noted   Psychiatric:         Mood and Affect: Mood normal.         Behavior: Behavior normal.         CRANIAL NERVES     CN III, IV, VI   Pupils are equal, round, and reactive to light.     Significant Labs: All pertinent labs within the past 24 hours have been reviewed.  CBC:   Recent Labs   Lab  05/17/22  0916 05/17/22  1519   WBC 12.35 8.55   HGB 14.9 13.2*   HCT 45.1 39.4*    132*     CMP:   Recent Labs   Lab 05/17/22  0916 05/17/22  1519   * 131*   K 4.1 3.8   CL 97 100   CO2 27 22*   * 174*   BUN 37* 36*   CREATININE 1.8* 1.7*   CALCIUM 8.7 8.1*   PROT 7.5 6.1   ALBUMIN 2.5* 2.4*   BILITOT 1.5* 1.2*   ALKPHOS 180* 137*   AST 62* 51*   ALT 41 32   ANIONGAP 8 9   EGFRNONAA 41.4* 44.4*       Significant Imaging: I have reviewed all pertinent imaging results/findings within the past 24 hours.    No orders to display

## 2022-05-17 NOTE — ASSESSMENT & PLAN NOTE
Patient with acute kidney injury likely d/t Pre-renal azotemia . Labs reviewed- Renal function/electrolytes with Estimated Creatinine Clearance: 59.6 mL/min (A) (based on SCr of 1.7 mg/dL (H)). according to latest data. Monitor urine output and serial BMP and adjust therapy as needed. Avoid nephrotoxins and renally dose meds for GFR listed above.     Concern for HRS given current decompensated liver cirrrhosis vs volume depletion s/o poor PO intake, though high suspicion at this time for HRS.     -- f/u urine lytes  -- holding home lasix and spirinolactone  -- midodrine 5 TIDWM to maintain MAP > 80  -- octreotride 100 mcg subq q8h  -- albumin load 1g/kg x2 days   -- avoid nephrotoxic medications  -- renally dose meds

## 2022-05-17 NOTE — H&P
[facility] - Emergency Dept  [facility] Medicine  History & Physical    Patient Name: Nic Munoz  MRN: [id number]  Patient Class: OP- Observation  Admission Date: 5/17/2022  Attending Physician: Kim Barajas MD   Primary Care Provider: Primary Doctor No         Patient information was obtained from patient, past medical records and ER records.     Subjective:     Principal Problem:Alcoholic cirrhosis of liver with ascites    Chief Complaint:   Chief Complaint   Patient presents with    Referral     Was sent by transplant  From clinic for paracentesis.         HPI: 55 y/o M hx of alcoholic cirrhosis, HTN, hepatitis B on antiviral therapy presents as a transfer to [facility] for hepatology eval for possible transplant. Pt comes from [place], [place]. Pt reports long history of cirrhosis; he states he has been receiving regular large-volume paracenteses for the past 10 months and he says they have been steadily increasing in frequency. He was referred last week to [facility] transplant team for a TIPS EVAL. He was evaluated in clinic day of admission with Dr. Yan and was deemed not a candidate for TIPS currently given MELD of 20. He underwent a paracentesis and then was directed to the [facility] ED for admission to the hospital for transplant workup. On interview pt reports feeling significantly better post-para. He denies any current acute complaints. He denies fever, chills, nausea, vomiting. Abdominal pain improved post-para. He does report significant LE swelling.     In the ED, Pt HDS, afebrile, /74, HR 70s. CBC without leukocytosis, mild anemia with hgb 13.2, Plt 132. CMP notable for Na 131, Cr elevated to 1.7 (from baseline 1.0). Ammonia 70. INR 1.3. paracentesis labs pending at time of admission.       No past medical history on file.    No past surgical history on file.    Review of patient's allergies indicates:  No Known Allergies    Current Facility-Administered Medications on File Prior to Encounter    Medication    albumin human 25% 25 % bottle    [COMPLETED] albumin human 25% bottle    [COMPLETED] LIDOcaine HCL 10 mg/ml (1%) injection     Current Outpatient Medications on File Prior to Encounter   Medication Sig    fluticasone furoate-vilanteroL (BREO) 200-25 mcg/dose DsDv diskus inhaler Inhale 1 puff into the lungs once daily.    tenofovir disoproxil fumarate (VIREAD) 300 mg Tab Take 300 mg by mouth.    lactulose (CHRONULAC) 10 gram/15 mL solution Take 30 mLs by mouth 2 (two) times daily.     Family History    None       Tobacco Use    Smoking status: Not on file    Smokeless tobacco: Not on file   Substance and Sexual Activity    Alcohol use: Not on file    Drug use: Not on file    Sexual activity: Not on file     Review of Systems   Constitutional:  Positive for fatigue. Negative for chills, diaphoresis and fever.   HENT:  Negative for congestion and sore throat.    Respiratory:  Negative for cough and shortness of breath.    Cardiovascular:  Positive for leg swelling. Negative for chest pain and palpitations.   Gastrointestinal:  Positive for abdominal distention and abdominal pain. Negative for constipation, diarrhea, nausea and vomiting.   Genitourinary:  Negative for dysuria and hematuria.   Musculoskeletal:  Negative for arthralgias and myalgias.   Skin:  Positive for color change. Negative for rash and wound.   Neurological:  Negative for dizziness, seizures, syncope, weakness and headaches.   Psychiatric/Behavioral:  Negative for confusion. The patient is not nervous/anxious.    Objective:     Vital Signs (Most Recent):  Temp: 98.1 °F (36.7 °C) (05/17/22 1802)  Pulse: 78 (05/17/22 1802)  Resp: 12 (05/17/22 1802)  BP: 113/74 (05/17/22 1802)  SpO2: 97 % (05/17/22 1802) Vital Signs (24h Range):  Temp:  [97.2 °F (36.2 °C)-98.1 °F (36.7 °C)] 98.1 °F (36.7 °C)  Pulse:  [66-78] 78  Resp:  [12-28] 12  SpO2:  [92 %-97 %] 97 %  BP: (104-134)/(66-81) 113/74     Weight: 98 kg (216 lb)  Body mass index  is 29.29 kg/m².    Physical Exam  Vitals and nursing note reviewed.   Constitutional:       General: He is not in acute distress.     Appearance: He is ill-appearing. He is not toxic-appearing or diaphoretic.      Comments: Pleasant, ill-appearing male lying in bed in NAD.   HENT:      Head: Normocephalic and atraumatic.      Nose: Nose normal. No congestion.      Mouth/Throat:      Mouth: Mucous membranes are dry.      Pharynx: Oropharynx is clear.      Comments: Poor dentition  Eyes:      General: Scleral icterus present.      Extraocular Movements: Extraocular movements intact.      Pupils: Pupils are equal, round, and reactive to light.   Cardiovascular:      Rate and Rhythm: Normal rate and regular rhythm.      Pulses: Normal pulses.      Heart sounds: Normal heart sounds. No murmur heard.    No friction rub. No gallop.   Pulmonary:      Effort: Pulmonary effort is normal.      Breath sounds: Normal breath sounds.   Abdominal:      Comments: Abdomen distended. Nontender to palpation. Normoactive bowel sounds.    Musculoskeletal:      Cervical back: Normal range of motion and neck supple.      Right lower leg: Edema (3+ pitting) present.      Left lower leg: Edema (3+ pitting) present.   Skin:     General: Skin is warm and dry.      Coloration: Skin is jaundiced.   Neurological:      General: No focal deficit present.      Mental Status: He is alert and oriented to person, place, and time.      Comments: No asterixis or tremors noted   Psychiatric:         Mood and Affect: Mood normal.         Behavior: Behavior normal.         CRANIAL NERVES     CN III, IV, VI   Pupils are equal, round, and reactive to light.     Significant Labs: All pertinent labs within the past 24 hours have been reviewed.  CBC:   Recent Labs   Lab 05/17/22  0916 05/17/22  1519   WBC 12.35 8.55   HGB 14.9 13.2*   HCT 45.1 39.4*    132*     CMP:   Recent Labs   Lab 05/17/22  0916 05/17/22  1519   * 131*   K 4.1 3.8   CL 97 100  "  CO2 27 22*   * 174*   BUN 37* 36*   CREATININE 1.8* 1.7*   CALCIUM 8.7 8.1*   PROT 7.5 6.1   ALBUMIN 2.5* 2.4*   BILITOT 1.5* 1.2*   ALKPHOS 180* 137*   AST 62* 51*   ALT 41 32   ANIONGAP 8 9   EGFRNONAA 41.4* 44.4*       Significant Imaging: I have reviewed all pertinent imaging results/findings within the past 24 hours.    No orders to display         Assessment/Plan:     * Alcoholic cirrhosis of liver with ascites  57 y/o M hx of alcoholic cirrhosis, Hep B cirrhosis, HTN presents from hepatology clinic with decompensated cirrhosis and for tx eval. Pt underwent paracentesis in clinic with labs sent. Patient afebrile, HDS and in no distress on admission. Hepatology consulted, appreciate their input.     Plan:  -- current meld 20  -- hepatology consulted on admission  -- lactulose 15 TID; titrate to 3 BM per day  -- HRS management with octreotide, midodrine, albumin as below under "BC"  -- currently holding lasix, spironolactone   -- Ceftriaxone for SBP prophylaxis  -- f/u para labs      BC (acute kidney injury)  Patient with acute kidney injury likely d/t Pre-renal azotemia . Labs reviewed- Renal function/electrolytes with Estimated Creatinine Clearance: 59.6 mL/min (A) (based on SCr of 1.7 mg/dL (H)). according to latest data. Monitor urine output and serial BMP and adjust therapy as needed. Avoid nephrotoxins and renally dose meds for GFR listed above.     Concern for HRS given current decompensated liver cirrrhosis vs volume depletion s/o poor PO intake, though high suspicion at this time for HRS.     -- f/u urine lytes  -- holding home lasix and spirinolactone  -- midodrine 5 TIDWM to maintain MAP > 80  -- octreotride 100 mcg subq q8h  -- albumin load 1g/kg x2 days   -- avoid nephrotoxic medications  -- renally dose meds      Chronic hepatitis B with cirrhosis  -- continue Tenofovir 300 mg qd  -- per hepatology tx note, can consider transition to Vemlidy  -- will f/u hepatology recs      HTN " (hypertension)  Patient reports history of HTN, not currently on any antihypertensive medications. Patient normotensive in ED.         VTE Risk Mitigation (From admission, onward)         Ordered     IP VTE LOW RISK PATIENT  Once         05/17/22 1714     Place sequential compression device  Until discontinued         05/17/22 1714                   Tomás Pizarro MD   Internal Medicine PGY-1  Department of Hospital Medicine   [facility] - Emergency Dept

## 2022-05-17 NOTE — HPI
55 y/o M hx of alcoholic cirrhosis, HTN, hepatitis B on antiviral therapy presents as a transfer to [facility] for hepatology eval for possible transplant. Pt comes from [place], [place]. Pt reports long history of cirrhosis; he states he has been receiving regular large-volume paracenteses for the past 10 months and he says they have been steadily increasing in frequency. He was referred last week to [facility] transplant team for a TIPS EVAL. He was evaluated in clinic day of admission with Dr. Yan and was deemed not a candidate for TIPS currently given MELD of 20. He underwent a paracentesis and then was directed to the [facility] ED for admission to the hospital for transplant workup. On interview pt reports feeling significantly better post-para. He denies any current acute complaints. He denies fever, chills, nausea, vomiting. Abdominal pain improved post-para. He does report significant LE swelling.     In the ED, Pt HDS, afebrile, /74, HR 70s. CBC without leukocytosis, mild anemia with hgb 13.2, Plt 132. CMP notable for Na 131, Cr elevated to 1.7 (from baseline 1.0). Ammonia 70. INR 1.3. paracentesis labs pending at time of admission.

## 2022-05-18 LAB
ALBUMIN SERPL BCP-MCNC: 3.1 G/DL (ref 3.5–5.2)
ALP SERPL-CCNC: 115 U/L (ref 55–135)
ALT SERPL W/O P-5'-P-CCNC: 25 U/L (ref 10–44)
ANION GAP SERPL CALC-SCNC: 7 MMOL/L (ref 8–16)
AST SERPL-CCNC: 34 U/L (ref 10–40)
BASOPHILS # BLD AUTO: 0.02 K/UL (ref 0–0.2)
BASOPHILS NFR BLD: 0.3 % (ref 0–1.9)
BILIRUB SERPL-MCNC: 1.8 MG/DL (ref 0.1–1)
BUN SERPL-MCNC: 31 MG/DL (ref 6–20)
CALCIUM SERPL-MCNC: 8.3 MG/DL (ref 8.7–10.5)
CHLORIDE SERPL-SCNC: 102 MMOL/L (ref 95–110)
CO2 SERPL-SCNC: 24 MMOL/L (ref 23–29)
CREAT SERPL-MCNC: 1.3 MG/DL (ref 0.5–1.4)
DIFFERENTIAL METHOD: ABNORMAL
EOSINOPHIL # BLD AUTO: 0.1 K/UL (ref 0–0.5)
EOSINOPHIL NFR BLD: 2 % (ref 0–8)
ERYTHROCYTE [DISTWIDTH] IN BLOOD BY AUTOMATED COUNT: 14.3 % (ref 11.5–14.5)
EST. GFR  (AFRICAN AMERICAN): >60 ML/MIN/1.73 M^2
EST. GFR  (NON AFRICAN AMERICAN): >60 ML/MIN/1.73 M^2
GLUCOSE SERPL-MCNC: 95 MG/DL (ref 70–110)
HCT VFR BLD AUTO: 36.6 % (ref 40–54)
HGB BLD-MCNC: 12 G/DL (ref 14–18)
IMM GRANULOCYTES # BLD AUTO: 0.07 K/UL (ref 0–0.04)
IMM GRANULOCYTES NFR BLD AUTO: 1.1 % (ref 0–0.5)
INR PPP: 1.3 (ref 0.8–1.2)
LYMPHOCYTES # BLD AUTO: 0.9 K/UL (ref 1–4.8)
LYMPHOCYTES NFR BLD: 14 % (ref 18–48)
MAGNESIUM SERPL-MCNC: 1.9 MG/DL (ref 1.6–2.6)
MCH RBC QN AUTO: 30.3 PG (ref 27–31)
MCHC RBC AUTO-ENTMCNC: 32.8 G/DL (ref 32–36)
MCV RBC AUTO: 92 FL (ref 82–98)
MONOCYTES # BLD AUTO: 1 K/UL (ref 0.3–1)
MONOCYTES NFR BLD: 15.3 % (ref 4–15)
NEUTROPHILS # BLD AUTO: 4.4 K/UL (ref 1.8–7.7)
NEUTROPHILS NFR BLD: 67.3 % (ref 38–73)
NRBC BLD-RTO: 0 /100 WBC
PLATELET # BLD AUTO: 93 K/UL (ref 150–450)
PMV BLD AUTO: 10.5 FL (ref 9.2–12.9)
POTASSIUM SERPL-SCNC: 3.9 MMOL/L (ref 3.5–5.1)
PROT SERPL-MCNC: 6 G/DL (ref 6–8.4)
PROTHROMBIN TIME: 13.6 SEC (ref 9–12.5)
RBC # BLD AUTO: 3.96 M/UL (ref 4.6–6.2)
SODIUM SERPL-SCNC: 133 MMOL/L (ref 136–145)
WBC # BLD AUTO: 6.59 K/UL (ref 3.9–12.7)

## 2022-05-18 PROCEDURE — 83735 ASSAY OF MAGNESIUM: CPT | Mod: NTX

## 2022-05-18 PROCEDURE — G0378 HOSPITAL OBSERVATION PER HR: HCPCS | Mod: NTX

## 2022-05-18 PROCEDURE — 87522 HEPATITIS C REVRS TRNSCRPJ: CPT | Mod: NTX | Performed by: STUDENT IN AN ORGANIZED HEALTH CARE EDUCATION/TRAINING PROGRAM

## 2022-05-18 PROCEDURE — 25000003 PHARM REV CODE 250: Mod: NTX | Performed by: STUDENT IN AN ORGANIZED HEALTH CARE EDUCATION/TRAINING PROGRAM

## 2022-05-18 PROCEDURE — 25000242 PHARM REV CODE 250 ALT 637 W/ HCPCS: Mod: NTX | Performed by: STUDENT IN AN ORGANIZED HEALTH CARE EDUCATION/TRAINING PROGRAM

## 2022-05-18 PROCEDURE — 85610 PROTHROMBIN TIME: CPT | Mod: NTX | Performed by: STUDENT IN AN ORGANIZED HEALTH CARE EDUCATION/TRAINING PROGRAM

## 2022-05-18 PROCEDURE — 99225 PR SUBSEQUENT OBSERVATION CARE,LEVEL II: CPT | Mod: NTX,,, | Performed by: HOSPITALIST

## 2022-05-18 PROCEDURE — 99215 OFFICE O/P EST HI 40 MIN: CPT | Mod: NTX,,, | Performed by: INTERNAL MEDICINE

## 2022-05-18 PROCEDURE — 96372 THER/PROPH/DIAG INJ SC/IM: CPT

## 2022-05-18 PROCEDURE — 80053 COMPREHEN METABOLIC PANEL: CPT | Mod: NTX

## 2022-05-18 PROCEDURE — 85025 COMPLETE CBC W/AUTO DIFF WBC: CPT | Mod: NTX

## 2022-05-18 PROCEDURE — 99225 PR SUBSEQUENT OBSERVATION CARE,LEVEL II: ICD-10-PCS | Mod: NTX,,, | Performed by: HOSPITALIST

## 2022-05-18 PROCEDURE — 25000003 PHARM REV CODE 250: Mod: NTX

## 2022-05-18 PROCEDURE — 99215 PR OFFICE/OUTPT VISIT, EST, LEVL V, 40-54 MIN: ICD-10-PCS | Mod: NTX,,, | Performed by: INTERNAL MEDICINE

## 2022-05-18 PROCEDURE — 63600175 PHARM REV CODE 636 W HCPCS: Mod: JB,NTX

## 2022-05-18 PROCEDURE — 87517 HEPATITIS B DNA QUANT: CPT | Mod: NTX | Performed by: STUDENT IN AN ORGANIZED HEALTH CARE EDUCATION/TRAINING PROGRAM

## 2022-05-18 RX ORDER — SPIRONOLACTONE 25 MG/1
50 TABLET ORAL DAILY
Status: DISCONTINUED | OUTPATIENT
Start: 2022-05-19 | End: 2022-05-21

## 2022-05-18 RX ORDER — ALBUTEROL SULFATE 90 UG/1
2 AEROSOL, METERED RESPIRATORY (INHALATION) EVERY 6 HOURS PRN
Status: DISCONTINUED | OUTPATIENT
Start: 2022-05-18 | End: 2022-05-25 | Stop reason: HOSPADM

## 2022-05-18 RX ORDER — FUROSEMIDE 40 MG/1
40 TABLET ORAL DAILY
Status: DISCONTINUED | OUTPATIENT
Start: 2022-05-19 | End: 2022-05-24

## 2022-05-18 RX ORDER — LACTULOSE 10 G/15ML
30 SOLUTION ORAL 3 TIMES DAILY
Status: DISCONTINUED | OUTPATIENT
Start: 2022-05-18 | End: 2022-05-25 | Stop reason: HOSPADM

## 2022-05-18 RX ORDER — SIMETHICONE 80 MG
1 TABLET,CHEWABLE ORAL 3 TIMES DAILY PRN
Status: DISCONTINUED | OUTPATIENT
Start: 2022-05-18 | End: 2022-05-25 | Stop reason: HOSPADM

## 2022-05-18 RX ADMIN — FLUTICASONE FUROATE AND VILANTEROL TRIFENATATE 1 PUFF: 100; 25 POWDER RESPIRATORY (INHALATION) at 09:05

## 2022-05-18 RX ADMIN — LACTULOSE 15 G: 20 SOLUTION ORAL at 09:05

## 2022-05-18 RX ADMIN — PANTOPRAZOLE SODIUM 40 MG: 40 TABLET, DELAYED RELEASE ORAL at 09:05

## 2022-05-18 RX ADMIN — MIDODRINE HYDROCHLORIDE 10 MG: 5 TABLET ORAL at 11:05

## 2022-05-18 RX ADMIN — MIDODRINE HYDROCHLORIDE 10 MG: 5 TABLET ORAL at 08:05

## 2022-05-18 RX ADMIN — TENOFOVIR DISPROXIL FUMARATE 300 MG: 300 TABLET ORAL at 01:05

## 2022-05-18 RX ADMIN — LACTULOSE 30 G: 20 SOLUTION ORAL at 10:05

## 2022-05-18 RX ADMIN — OCTREOTIDE ACETATE 100 MCG: 100 INJECTION, SOLUTION INTRAVENOUS; SUBCUTANEOUS at 06:05

## 2022-05-18 RX ADMIN — SIMETHICONE 80 MG: 80 TABLET, CHEWABLE ORAL at 05:05

## 2022-05-18 RX ADMIN — LACTULOSE 30 G: 20 SOLUTION ORAL at 09:05

## 2022-05-18 NOTE — PROGRESS NOTES
[facility] Medicine  Progress Note    Patient Name: Nic Munoz  MRN: [id number]  Patient Class: OP- Observation   Admission Date: 5/17/2022  Length of Stay: 0 days  Attending Physician: Shannan Muhammad*  Primary Care Provider: Primary Doctor No        Subjective:     Principal Problem:Alcoholic cirrhosis of liver with ascites        HPI:  57 y/o M hx of alcoholic cirrhosis, HTN, hepatitis B on antiviral therapy presents as a transfer to [facility] for hepatology eval for possible transplant. Pt comes from [place], [place]. Pt reports long history of cirrhosis; he states he has been receiving regular large-volume paracenteses for the past 10 months and he says they have been steadily increasing in frequency. He was referred last week to [facility] transplant team for a TIPS EVAL. He was evaluated in clinic day of admission with Dr. Yan and was deemed not a candidate for TIPS currently given MELD of 20. He underwent a paracentesis and then was directed to the [facility] ED for admission to the hospital for transplant workup. On interview pt reports feeling significantly better post-para. He denies any current acute complaints. He denies fever, chills, nausea, vomiting. Abdominal pain improved post-para. He does report significant LE swelling.     In the ED, Pt HDS, afebrile, /74, HR 70s. CBC without leukocytosis, mild anemia with hgb 13.2, Plt 132. CMP notable for Na 131, Cr elevated to 1.7 (from baseline 1.0). Ammonia 70. INR 1.3. paracentesis labs pending at time of admission.       Overview/Hospital Course:  Patient admitted for management of BC, decompensated cirrhosis, and hepatology eval. Pt given lactulose and ctx for SBP prophylaxis. Midodrine, octreotide, albumin given for likely HRS. Patient continued on home Tenofovir for HBV. Hepatology consulted on admission. The following day patient's Cr improving. CTAP demonstrating nonobstructive renal calculi, no hydronephrosis.  Paracentesis labs not c/w SBP, so Ctx discontinued. MELD improving. Will f/u hepatology recs. Pt taken off HRS protocol on 5/18 per hepatology. Hepatology will start tx workup. Anticipate transfer to IML team when space available.       Interval History: NAEON. Pt reports doing well this morning. Reports intermittent crampy abdominal pain. No chest pain, SOB, fever, chills. Leg cramping improving with methocarbamol.     Review of Systems   Constitutional:  Positive for fatigue. Negative for chills, diaphoresis and fever.   HENT:  Negative for congestion and sore throat.    Respiratory:  Negative for cough and shortness of breath.    Cardiovascular:  Positive for leg swelling. Negative for chest pain and palpitations.   Gastrointestinal:  Positive for abdominal distention and abdominal pain. Negative for constipation, diarrhea, nausea and vomiting.   Genitourinary:  Negative for dysuria and hematuria.   Musculoskeletal:  Negative for arthralgias and myalgias.   Skin:  Positive for color change. Negative for rash and wound.   Neurological:  Negative for dizziness, seizures, syncope, weakness and headaches.   Psychiatric/Behavioral:  Negative for confusion. The patient is not nervous/anxious.    Objective:     Vital Signs (Most Recent):  Temp: 98.2 °F (36.8 °C) (05/18/22 1138)  Pulse: 69 (05/18/22 1138)  Resp: 18 (05/18/22 1138)  BP: 94/62 (05/18/22 1138)  SpO2: (!) 91 % (05/18/22 1138)   Vital Signs (24h Range):  Temp:  [98 °F (36.7 °C)-98.6 °F (37 °C)] 98.2 °F (36.8 °C)  Pulse:  [67-80] 69  Resp:  [12-18] 18  SpO2:  [90 %-97 %] 91 %  BP: ()/(55-81) 94/62     Weight: 91 kg (200 lb 9.9 oz)  Body mass index is 27.21 kg/m².    Intake/Output Summary (Last 24 hours) at 5/18/2022 1523  Last data filed at 5/18/2022 0800  Gross per 24 hour   Intake 290 ml   Output --   Net 290 ml      Physical Exam  Vitals and nursing note reviewed.   Constitutional:       General: He is not in acute distress.     Appearance: He is  ill-appearing. He is not toxic-appearing or diaphoretic.      Comments: Pleasant, ill-appearing male lying in bed in NAD.   HENT:      Head: Normocephalic and atraumatic.      Nose: Nose normal. No congestion.      Mouth/Throat:      Mouth: Mucous membranes are dry.      Pharynx: Oropharynx is clear.      Comments: Poor dentition  Eyes:      General: Scleral icterus present.      Extraocular Movements: Extraocular movements intact.      Pupils: Pupils are equal, round, and reactive to light.   Cardiovascular:      Rate and Rhythm: Normal rate and regular rhythm.      Pulses: Normal pulses.      Heart sounds: Normal heart sounds. No murmur heard.    No friction rub. No gallop.   Pulmonary:      Effort: Pulmonary effort is normal.      Breath sounds: Normal breath sounds.   Abdominal:      Comments: Abdomen distended. Nontender to palpation. Normoactive bowel sounds.    Musculoskeletal:      Cervical back: Normal range of motion and neck supple.      Right lower leg: Edema (3+ pitting) present.      Left lower leg: Edema (3+ pitting) present.   Skin:     General: Skin is warm and dry.      Coloration: Skin is jaundiced.   Neurological:      General: No focal deficit present.      Mental Status: He is alert and oriented to person, place, and time.      Comments: No asterixis or tremors noted   Psychiatric:         Mood and Affect: Mood normal.         Behavior: Behavior normal.       Significant Labs: All pertinent labs within the past 24 hours have been reviewed.  CBC:   Recent Labs   Lab 05/17/22  0916 05/17/22  1519 05/18/22  0415   WBC 12.35 8.55 6.59   HGB 14.9 13.2* 12.0*   HCT 45.1 39.4* 36.6*    132* 93*     CMP:   Recent Labs   Lab 05/17/22  0916 05/17/22  1519 05/18/22  0415   * 131* 133*   K 4.1 3.8 3.9   CL 97 100 102   CO2 27 22* 24   * 174* 95   BUN 37* 36* 31*   CREATININE 1.8* 1.7* 1.3   CALCIUM 8.7 8.1* 8.3*   PROT 7.5 6.1 6.0   ALBUMIN 2.5* 2.4* 3.1*   BILITOT 1.5* 1.2* 1.8*    ALKPHOS 180* 137* 115   AST 62* 51* 34   ALT 41 32 25   ANIONGAP 8 9 7*   EGFRNONAA 41.4* 44.4* >60.0     Coagulation:   Recent Labs   Lab 05/17/22  1519 05/18/22  0415   INR 1.3* 1.3*   APTT 27.1  --        Significant Imaging: I have reviewed all pertinent imaging results/findings within the past 24 hours.    X-Ray Chest AP Portable   Final Result      Bibasilar opacities compatible with atelectasis or consolidations.      Small right pleural effusion.         Electronically signed by: Kurtis Barnett   Date:    05/17/2022   Time:    21:58      CT Renal Stone Study Abd Pelvis WO   Final Result      1. Cirrhosis of the liver with diffuse ascites.   2. Splenomegaly suggesting portal venous hypertension.   3. Nonobstructing nephrolithiasis of the left kidney.   4. Right basilar pleural effusion with associated mild atelectasis or consolidation.  Mild left basilar atelectasis also.   5. Small umbilical hernia.   6. Mild probable chronic compression fracture of T12.   7. Remote fractures of ribs 8 on the left, 4 and 7 on the right.   8. Possible constipation.         Electronically signed by: Gary Camarena   Date:    05/17/2022   Time:    21:52      US Abdomen Complete with Dopp_Pre Liver Transplant (xpd)    (Results Pending)           Assessment/Plan:      * Alcoholic cirrhosis of liver with ascites  55 y/o M hx of alcoholic cirrhosis, Hep B cirrhosis, HTN presents from hepatology clinic with decompensated cirrhosis and for tx eval. Pt underwent paracentesis in clinic with labs sent. Patient afebrile, HDS and in no distress on admission. Hepatology consulted, appreciate their input.     Pt to be transferred to [facility] when space available per hepatology. Will continue tx eval and workup.     Plan:  -- current meld 17  -- hepatology consulted on admission  -- lactulose 30 TID; titrate to 3 BM per day  -- discontinuing HRS protocol per hepatology on 5/18  -- will resume low-dose lasix, spironolactone on 5/19 am  -- D/C ctx  as low suspicion for SBP and para studies negative for SBP  -- US liver w/ doppler ordered  -- daily CBC, CMP, INR      Esophageal varices without bleeding  Last EGD 2/2022 at OSH:  Grade I varices were found in the lower third of the esophagus. Scarring noted from prior banding. Moderate portal hypertensive gastropathy was found in the entire examined stomach. Diffuse moderately erythematous mucosa without active bleeding and with   no stigmata of bleeding was found in the duodenal bulb.   Was recommended for Inderal LA 80mg daily, Lasix 40 BID, Aldactone 100mg BID, and Protonix 40mg daily  F/u 3 months and US portal vein doppler (not in CareEverywhere)                        PLAN  - Defer to hepatology  - resuming low-dose lasix and spironolactone on 5/19        Ureteral stent retained        BC (acute kidney injury)  Patient with acute kidney injury likely d/t Pre-renal azotemia . Labs reviewed- Renal function/electrolytes with Estimated Creatinine Clearance: 59.6 mL/min (A) (based on SCr of 1.7 mg/dL (H)). according to latest data. Monitor urine output and serial BMP and adjust therapy as needed. Avoid nephrotoxins and renally dose meds for GFR listed above.     Concern for HRS given current decompensated liver cirrrhosis vs volume depletion s/o poor PO intake, though high suspicion at this time for HRS. On 5/18 pt Cr improving to 1.3, ok to discontinue HRS protocol per hepatology.     -- resume low-dose lasix, spironolactone on 5/19 am  -- discontinue albumin, octreotide, midodrine  -- avoid nephrotoxic medications  -- renally dose meds  -- CT AP with nonobstructive renal calculi, no hydronephrosis      Chronic hepatitis B with cirrhosis  -- continue Tenofovir 300 mg qd        HTN (hypertension)  Patient reports history of HTN, not currently on any antihypertensive medications. Patient normotensive in ED. Softer pressures while admitted. Will continue to monitor.         VTE Risk Mitigation (From admission,  onward)         Ordered     IP VTE LOW RISK PATIENT  Once         05/17/22 1714     Place sequential compression device  Until discontinued         05/17/22 1714                Discharge Planning   ERIKA: 5/20/2022     Code Status: Full Code   Is the patient medically ready for discharge?: No    Reason for patient still in hospital (select all that apply): Treatment  Discharge Plan A: Home                  Tomás Pizarro MD   Internal Medicine PGY-1  Department of [facility] Medicine   [address] - Observation

## 2022-05-18 NOTE — ASSESSMENT & PLAN NOTE
57 y/o M hx of alcoholic cirrhosis, Hep B cirrhosis, HTN presents from hepatology clinic with decompensated cirrhosis and for tx eval. Pt underwent paracentesis in clinic with labs sent. Patient afebrile, HDS and in no distress on admission. Hepatology consulted, appreciate their input.     Pt to be transferred to IML when space available per hepatology. Will continue tx eval and workup.     Plan:  -- current meld 17  -- hepatology consulted on admission  -- lactulose 30 TID; titrate to 3 BM per day  -- discontinuing HRS protocol per hepatology on 5/18  -- will resume low-dose lasix, spironolactone on 5/19 am  -- D/C ctx as low suspicion for SBP and para studies negative for SBP  -- US liver w/ doppler ordered  -- daily CBC, CMP, INR

## 2022-05-18 NOTE — PLAN OF CARE
Problem: Adult Inpatient Plan of Care  Goal: Plan of Care Review  Outcome: Ongoing, Progressing  Goal: Patient-Specific Goal (Individualized)  Outcome: Ongoing, Progressing  Goal: Absence of Hospital-Acquired Illness or Injury  Outcome: Ongoing, Progressing  Goal: Optimal Comfort and Wellbeing  Outcome: Ongoing, Progressing  Goal: Readiness for Transition of Care  Outcome: Ongoing, Progressing     Problem: Fluid and Electrolyte Imbalance (Acute Kidney Injury/Impairment)  Goal: Fluid and Electrolyte Balance  Outcome: Ongoing, Progressing     Problem: Renal Function Impairment (Acute Kidney Injury/Impairment)  Goal: Effective Renal Function  Outcome: Ongoing, Progressing

## 2022-05-18 NOTE — ASSESSMENT & PLAN NOTE
56M with alcoholic cirrhosis decompensated by recurrent ascites and recent HE is being admitted for inpatient transplant evaluation for elevated MELD.   Not a candidate for TIPS due to high MELD and poor outcomes  Noted to have an BC which primary team has been treating for HRS with albumin, octreotide and midodrine.  SBP labs negative from para fluid 5/17    Recommendations:  - Can stop Ceftriaxone as no signs of SBP and labs negative  - Can discontinue HRS treatment protocol  - uptitrate lactulose (last BM 4 days ago)  - Resume low dose diuretics tomorrow AM  - Transfer to IM Liver service when able to  - Will begin inpatient transplant eval  - US Liver w doppler  - Daily CMP, INR, CBC  - order HBV and HCV serologies, DNA/RNA  - f/u PET

## 2022-05-18 NOTE — ED NOTES
Assumed care of pt from Moris RICHARDS. Pt AAOx4, VSS, NADN at this time. Pt free from fall/injury, side rails up x2, bed in lowest/locked position, side rails up x2, call light next to pt. Pt instructed to call for assistance, verbalizes understanding.

## 2022-05-18 NOTE — ASSESSMENT & PLAN NOTE
Patient with acute kidney injury likely d/t Pre-renal azotemia . Labs reviewed- Renal function/electrolytes with Estimated Creatinine Clearance: 59.6 mL/min (A) (based on SCr of 1.7 mg/dL (H)). according to latest data. Monitor urine output and serial BMP and adjust therapy as needed. Avoid nephrotoxins and renally dose meds for GFR listed above.     Concern for HRS given current decompensated liver cirrrhosis vs volume depletion s/o poor PO intake, though high suspicion at this time for HRS. On 5/18 pt Cr improving to 1.3, ok to discontinue HRS protocol per hepatology.     -- resume low-dose lasix, spironolactone on 5/19 am  -- discontinue albumin, octreotide, midodrine  -- avoid nephrotoxic medications  -- renally dose meds  -- CT AP with nonobstructive renal calculi, no hydronephrosis

## 2022-05-18 NOTE — SUBJECTIVE & OBJECTIVE
Interval History: NAEON. Pt reports doing well this morning. Reports intermittent crampy abdominal pain. No chest pain, SOB, fever, chills. Leg cramping improving with methocarbamol.     Review of Systems   Constitutional:  Positive for fatigue. Negative for chills, diaphoresis and fever.   HENT:  Negative for congestion and sore throat.    Respiratory:  Negative for cough and shortness of breath.    Cardiovascular:  Positive for leg swelling. Negative for chest pain and palpitations.   Gastrointestinal:  Positive for abdominal distention and abdominal pain. Negative for constipation, diarrhea, nausea and vomiting.   Genitourinary:  Negative for dysuria and hematuria.   Musculoskeletal:  Negative for arthralgias and myalgias.   Skin:  Positive for color change. Negative for rash and wound.   Neurological:  Negative for dizziness, seizures, syncope, weakness and headaches.   Psychiatric/Behavioral:  Negative for confusion. The patient is not nervous/anxious.    Objective:     Vital Signs (Most Recent):  Temp: 98.2 °F (36.8 °C) (05/18/22 1138)  Pulse: 69 (05/18/22 1138)  Resp: 18 (05/18/22 1138)  BP: 94/62 (05/18/22 1138)  SpO2: (!) 91 % (05/18/22 1138)   Vital Signs (24h Range):  Temp:  [98 °F (36.7 °C)-98.6 °F (37 °C)] 98.2 °F (36.8 °C)  Pulse:  [67-80] 69  Resp:  [12-18] 18  SpO2:  [90 %-97 %] 91 %  BP: ()/(55-81) 94/62     Weight: 91 kg (200 lb 9.9 oz)  Body mass index is 27.21 kg/m².    Intake/Output Summary (Last 24 hours) at 5/18/2022 1523  Last data filed at 5/18/2022 0800  Gross per 24 hour   Intake 290 ml   Output --   Net 290 ml      Physical Exam  Vitals and nursing note reviewed.   Constitutional:       General: He is not in acute distress.     Appearance: He is ill-appearing. He is not toxic-appearing or diaphoretic.      Comments: Pleasant, ill-appearing male lying in bed in NAD.   HENT:      Head: Normocephalic and atraumatic.      Nose: Nose normal. No congestion.      Mouth/Throat:      Mouth:  Mucous membranes are dry.      Pharynx: Oropharynx is clear.      Comments: Poor dentition  Eyes:      General: Scleral icterus present.      Extraocular Movements: Extraocular movements intact.      Pupils: Pupils are equal, round, and reactive to light.   Cardiovascular:      Rate and Rhythm: Normal rate and regular rhythm.      Pulses: Normal pulses.      Heart sounds: Normal heart sounds. No murmur heard.    No friction rub. No gallop.   Pulmonary:      Effort: Pulmonary effort is normal.      Breath sounds: Normal breath sounds.   Abdominal:      Comments: Abdomen distended. Nontender to palpation. Normoactive bowel sounds.    Musculoskeletal:      Cervical back: Normal range of motion and neck supple.      Right lower leg: Edema (3+ pitting) present.      Left lower leg: Edema (3+ pitting) present.   Skin:     General: Skin is warm and dry.      Coloration: Skin is jaundiced.   Neurological:      General: No focal deficit present.      Mental Status: He is alert and oriented to person, place, and time.      Comments: No asterixis or tremors noted   Psychiatric:         Mood and Affect: Mood normal.         Behavior: Behavior normal.       Significant Labs: All pertinent labs within the past 24 hours have been reviewed.  CBC:   Recent Labs   Lab 05/17/22  0916 05/17/22  1519 05/18/22  0415   WBC 12.35 8.55 6.59   HGB 14.9 13.2* 12.0*   HCT 45.1 39.4* 36.6*    132* 93*     CMP:   Recent Labs   Lab 05/17/22  0916 05/17/22  1519 05/18/22  0415   * 131* 133*   K 4.1 3.8 3.9   CL 97 100 102   CO2 27 22* 24   * 174* 95   BUN 37* 36* 31*   CREATININE 1.8* 1.7* 1.3   CALCIUM 8.7 8.1* 8.3*   PROT 7.5 6.1 6.0   ALBUMIN 2.5* 2.4* 3.1*   BILITOT 1.5* 1.2* 1.8*   ALKPHOS 180* 137* 115   AST 62* 51* 34   ALT 41 32 25   ANIONGAP 8 9 7*   EGFRNONAA 41.4* 44.4* >60.0     Coagulation:   Recent Labs   Lab 05/17/22  1519 05/18/22  0415   INR 1.3* 1.3*   APTT 27.1  --        Significant Imaging: I have reviewed  all pertinent imaging results/findings within the past 24 hours.    X-Ray Chest AP Portable   Final Result      Bibasilar opacities compatible with atelectasis or consolidations.      Small right pleural effusion.         Electronically signed by: Kurtis Barnett   Date:    05/17/2022   Time:    21:58      CT Renal Stone Study Abd Pelvis WO   Final Result      1. Cirrhosis of the liver with diffuse ascites.   2. Splenomegaly suggesting portal venous hypertension.   3. Nonobstructing nephrolithiasis of the left kidney.   4. Right basilar pleural effusion with associated mild atelectasis or consolidation.  Mild left basilar atelectasis also.   5. Small umbilical hernia.   6. Mild probable chronic compression fracture of T12.   7. Remote fractures of ribs 8 on the left, 4 and 7 on the right.   8. Possible constipation.         Electronically signed by: Gary Camarena   Date:    05/17/2022   Time:    21:52      US Abdomen Complete with Dopp_Pre Liver Transplant (xpd)    (Results Pending)

## 2022-05-18 NOTE — HOSPITAL COURSE
Patient admitted for management of BC, decompensated cirrhosis, and hepatology eval. Pt given lactulose and ctx for SBP prophylaxis. Midodrine, octreotide, albumin given for likely HRS. Patient continued on home Tenofovir for HBV. Hepatology consulted on admission. The following day patient's Cr improving. CTAP demonstrating nonobstructive renal calculi, no hydronephrosis. Paracentesis labs not c/w SBP, so Ctx discontinued. MELD improving. Will f/u hepatology recs. Pt taken off HRS protocol on 5/18 per hepatology. Hepatology will start tx workup. Anticipate transfer to IML team when space available.

## 2022-05-18 NOTE — HPI
57 y/o M hx of alcoholic cirrhosis, HTN, hepatitis B (on tenofovir) is being admitted from hepatology clinic for inpatient transplant evaluation for decompensated alcoholic cirrhosis. Pt comes from [place], [place]. Pt reports long history of alcoholic cirrhosis (last quit alcohol 4 years ago); he states he has been receiving regular large-volume paracenteses for the past 10 months (before yesterday last one on 5/12 when he was admitted at [facility] for HE). On discharge -he was referred last week to [facility] transplant team for a TIPS EVAL. He was evaluated in clinic day of admission (5/17) with Dr. Yan and was deemed not a candidate for TIPS currently given MELD of 20. He underwent a paracentesis with IR again on 5/17 and 5L was removed and then was directed to the [facility] ED for admission to the hospital for transplant workup. On interview pt reports feeling significantly better post-para. He denies any current acute complaints. He denies fever, chills, nausea, vomiting. Abdominal pain improved post-para. He does report significant LE swelling.     In the ED, Pt HDS, afebrile, /74, HR 70s. CBC without leukocytosis, mild anemia with hgb 13.2, Plt 132. CMP notable for Na 131, Cr elevated to 1.7 (from baseline 1.0). Ammonia 70. INR 1.3. paracentesis labs pending at time of admission.     Last bowel movement 5 days ago, no overt signs of GIB, no abdominal pain/tenderness. SBP labs negative from 5/17. Again abdominal distention this AM. MELD down to 17 now as BC resolved. Hgb from 15 down to 12 today.      Last Colonoscopy - 6 months ago at [facility] (per patient)

## 2022-05-18 NOTE — PLAN OF CARE
Per Care Everywhere review - pt has established para f/u. CM contacted [facility] IR and pt's para orders are placed by Dr Merchant as needed. CM contacted Dr Merchant's clinic (GI) - staff requested that pt contact clinic when discharged and they will submit an order for next para. Para here 5/17 so the earliest the next para can be is 5/24 per clinic. Clinic states they have access to Epic - no need to fax records. CM placed clinic info in Follow-Up w/ instructions to call at discharge. CM updated 7WT SW.    Kelle Dennison, HEATHERN, RN, [facility]  Transition Care Manager  [phone]

## 2022-05-18 NOTE — SUBJECTIVE & OBJECTIVE
Review of Systems   Constitutional:  Positive for fatigue. Negative for chills, diaphoresis and fever.   HENT:  Negative for congestion and sore throat.    Respiratory:  Negative for cough and shortness of breath.    Cardiovascular:  Positive for leg swelling. Negative for chest pain and palpitations.   Gastrointestinal:  Positive for abdominal distention and abdominal pain. Negative for constipation, diarrhea, nausea and vomiting.   Genitourinary:  Negative for dysuria and hematuria.   Musculoskeletal:  Negative for arthralgias and myalgias.   Skin:  Positive for color change. Negative for rash and wound.   Neurological:  Negative for dizziness, seizures, syncope, weakness and headaches.   Psychiatric/Behavioral:  Negative for confusion. The patient is not nervous/anxious.      No past medical history on file.    No past surgical history on file.    Family history of liver disease: No    Review of patient's allergies indicates:  No Known Allergies      Tobacco Use    Smoking status: Not on file    Smokeless tobacco: Not on file   Substance and Sexual Activity    Alcohol use: Not on file    Drug use: Not on file    Sexual activity: Not on file       Medications Prior to Admission   Medication Sig Dispense Refill Last Dose    fluticasone furoate-vilanteroL (BREO) 200-25 mcg/dose DsDv diskus inhaler Inhale 1 puff into the lungs once daily.       tenofovir disoproxil fumarate (VIREAD) 300 mg Tab Take 300 mg by mouth.       lactulose (CHRONULAC) 10 gram/15 mL solution Take 30 mLs by mouth 2 (two) times daily.          Objective:     Vital Signs (Most Recent):  Temp: 98.2 °F (36.8 °C) (05/18/22 1138)  Pulse: 69 (05/18/22 1138)  Resp: 18 (05/18/22 1138)  BP: 94/62 (05/18/22 1138)  SpO2: (!) 91 % (05/18/22 1138)   Vital Signs (24h Range):  Temp:  [97.9 °F (36.6 °C)-98.6 °F (37 °C)] 98.2 °F (36.8 °C)  Pulse:  [67-80] 69  Resp:  [12-18] 18  SpO2:  [90 %-97 %] 91 %  BP: ()/(55-81) 94/62     Weight: 91 kg (200 lb 9.9 oz)  (05/18/22 0118)  Body mass index is 27.21 kg/m².    Physical Exam  Vitals and nursing note reviewed.   Constitutional:       General: He is not in acute distress.     Appearance: He is ill-appearing. He is not toxic-appearing or diaphoretic.      Comments: Pleasant, ill-appearing male lying in bed in NAD.  Temporal wasting  Overall frail   HENT:      Head: Normocephalic and atraumatic.      Nose: Nose normal. No congestion.      Mouth/Throat:      Mouth: Mucous membranes are dry.      Pharynx: Oropharynx is clear.      Comments: Poor dentition  Eyes:      General: Scleral icterus present.      Extraocular Movements: Extraocular movements intact.      Pupils: Pupils are equal, round, and reactive to light.   Cardiovascular:      Rate and Rhythm: Normal rate and regular rhythm.      Pulses: Normal pulses.      Heart sounds: Normal heart sounds. No murmur heard.    No friction rub. No gallop.   Pulmonary:      Effort: Pulmonary effort is normal.      Breath sounds: Normal breath sounds.   Abdominal:      Comments: Abdomen distended. Nontender to palpation. Normoactive bowel sounds.    Musculoskeletal:      Cervical back: Normal range of motion and neck supple.      Right lower leg: Edema (3+ pitting) present.      Left lower leg: Edema (3+ pitting) present.   Skin:     General: Skin is warm and dry.      Coloration: Skin is jaundiced.   Neurological:      General: No focal deficit present.      Mental Status: He is alert and oriented to person, place, and time.      Comments: No asterixis or tremors noted   Psychiatric:         Mood and Affect: Mood normal.         Behavior: Behavior normal.       MELD-Na score: 17 at 5/18/2022  4:15 AM  MELD score: 14 at 5/18/2022  4:15 AM  Calculated from:  Serum Creatinine: 1.3 mg/dL at 5/18/2022  4:15 AM  Serum Sodium: 133 mmol/L at 5/18/2022  4:15 AM  Total Bilirubin: 1.8 mg/dL at 5/18/2022  4:15 AM  INR(ratio): 1.3 at 5/18/2022  4:15 AM  Age: 56 years    Significant Labs:  Labs  within the past month have been reviewed.    Significant Imaging:  Reviewed

## 2022-05-18 NOTE — NURSING
Patient arrived from the ER via wheelchair. Patient aaox4, able to make all needs known. Patient ambulates with steady gait. Patient oriented to room and call system. Patient verbalizes understanding. Safety measures are in place, bed in lowest position, call bell and personal items are within reach.

## 2022-05-18 NOTE — ED NOTES
Pt noted to be standing in the room crying c/o 10/10 leg cramping. Juarez CHOW notified at this time via secure chat.

## 2022-05-18 NOTE — PLAN OF CARE
[facility]  Initial Discharge Assessment       Primary Care Provider: Primary Doctor No    Admission Diagnosis: Alcoholic cirrhosis of liver with ascites [K70.31]  BC (acute kidney injury) [N17.9]  Chest pain [R07.9]    Admission Date: 5/17/2022  Expected Discharge Date: 5/20/2022         Payor: [place] [employer]ICA[employer] / Plan: [facility] / Product Type: Managed Medicaid /     No emergency contact information on file.    Discharge Plan A: (P) Home  Discharge Plan B: (P) Home    No Pharmacies Listed             SW completed Discharge Planning Assessment with patient via bedside. Discharge planning booklet given to patient/family and whiteboard updated with ERIKA and phone #. All questions answered.    Patient reported that he will have transportation upon discharge.     Patient reported that he lives at home with his step-sister and prior to hospitalization he needed some assistance with his ADL's. Patient reported that he is not on dialysis and he does not go to a Coumadin clinic.      Patient lives in a [facility] with 0 steps to enter.       Valery Melton LMSW  [facility]  Ext. [phone]

## 2022-05-18 NOTE — CONSULTS
[facility]  Hepatology  Consult Note    Patient Name: Nic Munoz  MRN: [id number]  Admission Date: 5/17/2022  Hospital Length of Stay: 0 days  Attending Provider: Shannan Muhammad*   Primary Care Physician: Primary Doctor No  Principal Problem:Alcoholic cirrhosis of liver with ascites    Inpatient consult to Hepatology  Consult performed by: Kevin Walker MD  Consult ordered by: Tomás Pizarro MD        Subjective:     Transplant status: Pre-transplant    HPI:  55 y/o M hx of alcoholic cirrhosis, HTN, hepatitis B (on tenofovir) is being admitted from hepatology clinic for inpatient transplant evaluation for decompensated alcoholic cirrhosis. Pt comes from [place], [place]. Pt reports long history of alcoholic cirrhosis (last quit alcohol 4 years ago); he states he has been receiving regular large-volume paracenteses for the past 10 months (before yesterday last one on 5/12 when he was admitted at [facility] for HE). On discharge -he was referred last week to [facility] transplant team for a TIPS EVAL. He was evaluated in clinic day of admission (5/17) with Dr. Yan and was deemed not a candidate for TIPS currently given MELD of 20. He underwent a paracentesis with IR again on 5/17 and 5L was removed and then was directed [place] [facility] [place] for [place] to the hospital for transplant workup. On interview pt reports feeling significantly better post-para. He denies any current acute complaints. He denies fever, chills, nausea, vomiting. Abdominal pain improved post-para. He does report significant LE swelling.     In the ED, Pt HDS, afebrile, /74, HR 70s. CBC without leukocytosis, mild anemia with hgb 13.2, Plt 132. CMP notable for Na 131, Cr elevated to 1.7 (from baseline 1.0). Ammonia 70. INR 1.3. paracentesis labs pending at time of admission.     Last bowel movement 5 days ago, no overt signs of GIB, no abdominal pain/tenderness. SBP labs negative from 5/17. Again abdominal  distention this AM. MELD down to 17 now as BC resolved. Hgb from 15 down to 12 today.      Last Colonoscopy - 6 months ago at [facility] (per patient)      Review of Systems   Constitutional:  Positive for fatigue. Negative for chills, diaphoresis and fever.   HENT:  Negative for congestion and sore throat.    Respiratory:  Negative for cough and shortness of breath.    Cardiovascular:  Positive for leg swelling. Negative for chest pain and palpitations.   Gastrointestinal:  Positive for abdominal distention and abdominal pain. Negative for constipation, diarrhea, nausea and vomiting.   Genitourinary:  Negative for dysuria and hematuria.   Musculoskeletal:  Negative for arthralgias and myalgias.   Skin:  Positive for color change. Negative for rash and wound.   Neurological:  Negative for dizziness, seizures, syncope, weakness and headaches.   Psychiatric/Behavioral:  Negative for confusion. The patient is not nervous/anxious.      No past medical history on file.    No past surgical history on file.    Family history of liver disease: No    Review of patient's allergies indicates:  No Known Allergies      Tobacco Use    Smoking status: Not on file    Smokeless tobacco: Not on file   Substance and Sexual Activity    Alcohol use: Not on file    Drug use: Not on file    Sexual activity: Not on file       Medications Prior to Admission   Medication Sig Dispense Refill Last Dose    fluticasone furoate-vilanteroL (BREO) 200-25 mcg/dose DsDv diskus inhaler Inhale 1 puff into the lungs once daily.       tenofovir disoproxil fumarate (VIREAD) 300 mg Tab Take 300 mg by mouth.       lactulose (CHRONULAC) 10 gram/15 mL solution Take 30 mLs by mouth 2 (two) times daily.          Objective:     Vital Signs (Most Recent):  Temp: 98.2 °F (36.8 °C) (05/18/22 1138)  Pulse: 69 (05/18/22 1138)  Resp: 18 (05/18/22 1138)  BP: 94/62 (05/18/22 1138)  SpO2: (!) 91 % (05/18/22 1138)   Vital Signs (24h Range):  Temp:   [97.9 °F (36.6 °C)-98.6 °F (37 °C)] 98.2 °F (36.8 °C)  Pulse:  [67-80] 69  Resp:  [12-18] 18  SpO2:  [90 %-97 %] 91 %  BP: ()/(55-81) 94/62     Weight: 91 kg (200 lb 9.9 oz) (05/18/22 0118)  Body mass index is 27.21 kg/m².    Physical Exam  Vitals and nursing note reviewed.   Constitutional:       General: He is not in acute distress.     Appearance: He is ill-appearing. He is not toxic-appearing or diaphoretic.      Comments: Pleasant, ill-appearing male lying in bed in NAD.  Temporal wasting  Overall frail   HENT:      Head: Normocephalic and atraumatic.      Nose: Nose normal. No congestion.      Mouth/Throat:      Mouth: Mucous membranes are dry.      Pharynx: Oropharynx is clear.      Comments: Poor dentition  Eyes:      General: Scleral icterus present.      Extraocular Movements: Extraocular movements intact.      Pupils: Pupils are equal, round, and reactive to light.   Cardiovascular:      Rate and Rhythm: Normal rate and regular rhythm.      Pulses: Normal pulses.      Heart sounds: Normal heart sounds. No murmur heard.    No friction rub. No gallop.   Pulmonary:      Effort: Pulmonary effort is normal.      Breath sounds: Normal breath sounds.   Abdominal:      Comments: Abdomen distended. Nontender to palpation. Normoactive bowel sounds.    Musculoskeletal:      Cervical back: Normal range of motion and neck supple.      Right lower leg: Edema (3+ pitting) present.      Left lower leg: Edema (3+ pitting) present.   Skin:     General: Skin is warm and dry.      Coloration: Skin is jaundiced.   Neurological:      General: No focal deficit present.      Mental Status: He is alert and oriented to person, place, and time.      Comments: No asterixis or tremors noted   Psychiatric:         Mood and Affect: Mood normal.         Behavior: Behavior normal.       MELD-Na score: 17 at 5/18/2022  4:15 AM  MELD score: 14 at 5/18/2022  4:15 AM  Calculated from:  Serum Creatinine: 1.3 mg/dL at 5/18/2022  4:15  AM  Serum Sodium: 133 mmol/L at 5/18/2022  4:15 AM  Total Bilirubin: 1.8 mg/dL at 5/18/2022  4:15 AM  INR(ratio): 1.3 at 5/18/2022  4:15 AM  Age: 56 years    Significant Labs:  Labs within the past month have been reviewed.    Significant Imaging:  Reviewed    Assessment/Plan:     * Alcoholic cirrhosis of liver with ascites  56M with alcoholic cirrhosis decompensated by recurrent ascites and recent HE is being admitted for inpatient transplant evaluation for elevated MELD.   Not a candidate for TIPS due to high MELD and poor outcomes  Noted to have an BC which primary team has been treating for HRS with albumin, octreotide and midodrine.  SBP labs negative from para fluid 5/17    Recommendations:  - Can stop Ceftriaxone as no signs of SBP and labs negative  - Can discontinue HRS treatment protocol  - uptitrate lactulose (last BM 4 days ago)  - Resume low dose diuretics tomorrow AM  - Transfer to IM Liver service when able to  - Will begin inpatient transplant eval  - US Liver w doppler  - Daily CMP, INR, CBC  - order HBV and HCV serologies, DNA/RNA  - f/u PET        Thank you for your consult. I will follow-up with patient. Please contact us if you have any additional questions.    Kevin Walker MD  Hepatology  [facility]

## 2022-05-18 NOTE — ASSESSMENT & PLAN NOTE
Last EGD 2/2022 at OSH:  Grade I varices were found in the lower third of the esophagus. Scarring noted from prior banding. Moderate portal hypertensive gastropathy was found in the entire examined stomach. Diffuse moderately erythematous mucosa without active bleeding and with   no stigmata of bleeding was found in the duodenal bulb.   Was recommended for Inderal LA 80mg daily, Lasix 40 BID, Aldactone 100mg BID, and Protonix 40mg daily  F/u 3 months and US portal vein doppler (not in CareEverywhere)                        PLAN  - Defer to hepatology  - resuming low-dose lasix and spironolactone on 5/19

## 2022-05-18 NOTE — ASSESSMENT & PLAN NOTE
56M with alcoholic cirrhosis decompensated by recurrent ascites and recent HE is being admitted for inpatient transplant evaluation for elevated MELD.   Not a candidate for TIPS due to high MELD and poor outcomes  Noted to have an BC which primary team has been treating for HRS with albumin, octreotide and midodrine.  SBP labs negative from para fluid 5/17  US Liver doppler consistent with cirrhosis with portal hypertension sequelae    MELD-Na score: 15 at 5/19/2022  2:57 AM  MELD score: 13 at 5/19/2022  2:57 AM  Calculated from:  Serum Creatinine: 1.1 mg/dL at 5/19/2022  2:57 AM  Serum Sodium: 135 mmol/L at 5/19/2022  2:57 AM  Total Bilirubin: 2.5 mg/dL at 5/19/2022  2:57 AM  INR(ratio): 1.2 at 5/19/2022  2:57 AM  Age: 56 years    Recommendations:  - Continue lactulose  - Can resume diuretics today, BC has resolved  - Will begin inpatient transplant eval  - Will need addiction psych eval  - Consult IR for TIPS evaluation, MELD 15 today  - Therapeutic paracentesis for symptom relief  - Daily CMP, INR, CBC  - f/u HBV and HCV serologies, DNA/RNA  - f/u PET

## 2022-05-18 NOTE — ASSESSMENT & PLAN NOTE
Patient reports history of HTN, not currently on any antihypertensive medications. Patient normotensive in ED. Softer pressures while admitted. Will continue to monitor.

## 2022-05-19 LAB
ALBUMIN FLD-MCNC: 0.5 G/DL
ALBUMIN SERPL BCP-MCNC: 2.8 G/DL (ref 3.5–5.2)
ALP SERPL-CCNC: 125 U/L (ref 55–135)
ALT SERPL W/O P-5'-P-CCNC: 27 U/L (ref 10–44)
ANION GAP SERPL CALC-SCNC: 8 MMOL/L (ref 8–16)
APPEARANCE FLD: NORMAL
AST SERPL-CCNC: 40 U/L (ref 10–40)
BASOPHILS # BLD AUTO: 0.02 K/UL (ref 0–0.2)
BASOPHILS NFR BLD: 0.2 % (ref 0–1.9)
BILIRUB SERPL-MCNC: 2.5 MG/DL (ref 0.1–1)
BODY FLD TYPE: NORMAL
BODY FLUID SOURCE, LDH: NORMAL
BUN SERPL-MCNC: 26 MG/DL (ref 6–20)
CALCIUM SERPL-MCNC: 8.7 MG/DL (ref 8.7–10.5)
CHLORIDE SERPL-SCNC: 105 MMOL/L (ref 95–110)
CO2 SERPL-SCNC: 22 MMOL/L (ref 23–29)
COLOR FLD: NORMAL
CREAT SERPL-MCNC: 1.1 MG/DL (ref 0.5–1.4)
DIFFERENTIAL METHOD: ABNORMAL
EOSINOPHIL # BLD AUTO: 0.1 K/UL (ref 0–0.5)
EOSINOPHIL NFR BLD: 1.5 % (ref 0–8)
EOSINOPHIL NFR FLD MANUAL: 1 %
ERYTHROCYTE [DISTWIDTH] IN BLOOD BY AUTOMATED COUNT: 14.4 % (ref 11.5–14.5)
EST. GFR  (AFRICAN AMERICAN): >60 ML/MIN/1.73 M^2
EST. GFR  (NON AFRICAN AMERICAN): >60 ML/MIN/1.73 M^2
GLUCOSE SERPL-MCNC: 138 MG/DL (ref 70–110)
GRAM STN SPEC: NORMAL
GRAM STN SPEC: NORMAL
HCT VFR BLD AUTO: 41.8 % (ref 40–54)
HGB BLD-MCNC: 13.8 G/DL (ref 14–18)
HIV 1+2 AB+HIV1 P24 AG SERPL QL IA: NEGATIVE
IMM GRANULOCYTES # BLD AUTO: 0.05 K/UL (ref 0–0.04)
IMM GRANULOCYTES NFR BLD AUTO: 0.6 % (ref 0–0.5)
INR PPP: 1.2 (ref 0.8–1.2)
LDH FLD L TO P-CCNC: 81 U/L
LYMPHOCYTES # BLD AUTO: 0.9 K/UL (ref 1–4.8)
LYMPHOCYTES NFR BLD: 10 % (ref 18–48)
LYMPHOCYTES NFR FLD MANUAL: 78 %
MAGNESIUM SERPL-MCNC: 1.9 MG/DL (ref 1.6–2.6)
MCH RBC QN AUTO: 30.3 PG (ref 27–31)
MCHC RBC AUTO-ENTMCNC: 33 G/DL (ref 32–36)
MCV RBC AUTO: 92 FL (ref 82–98)
MESOTHL CELL NFR FLD MANUAL: 2 %
MONOCYTES # BLD AUTO: 1.1 K/UL (ref 0.3–1)
MONOCYTES NFR BLD: 12.3 % (ref 4–15)
MONOS+MACROS NFR FLD MANUAL: 5 %
NEUTROPHILS # BLD AUTO: 6.6 K/UL (ref 1.8–7.7)
NEUTROPHILS NFR BLD: 75.4 % (ref 38–73)
NEUTROPHILS NFR FLD MANUAL: 14 %
NRBC BLD-RTO: 0 /100 WBC
PLATELET # BLD AUTO: 100 K/UL (ref 150–450)
PMV BLD AUTO: 10.3 FL (ref 9.2–12.9)
POTASSIUM SERPL-SCNC: 3.7 MMOL/L (ref 3.5–5.1)
PROT FLD-MCNC: 1.2 G/DL
PROT SERPL-MCNC: 6.2 G/DL (ref 6–8.4)
PROTHROMBIN TIME: 12.6 SEC (ref 9–12.5)
RBC # BLD AUTO: 4.55 M/UL (ref 4.6–6.2)
SODIUM SERPL-SCNC: 135 MMOL/L (ref 136–145)
SPECIMEN SOURCE: NORMAL
SPECIMEN SOURCE: NORMAL
WBC # BLD AUTO: 8.73 K/UL (ref 3.9–12.7)
WBC # FLD: 250 /CU MM

## 2022-05-19 PROCEDURE — 63600175 PHARM REV CODE 636 W HCPCS: Mod: NTX | Performed by: HOSPITALIST

## 2022-05-19 PROCEDURE — 99232 SBSQ HOSP IP/OBS MODERATE 35: CPT | Mod: NTX,,, | Performed by: HOSPITALIST

## 2022-05-19 PROCEDURE — 85025 COMPLETE CBC W/AUTO DIFF WBC: CPT | Mod: NTX

## 2022-05-19 PROCEDURE — 86665 EPSTEIN-BARR CAPSID VCA: CPT | Mod: NTX | Performed by: STUDENT IN AN ORGANIZED HEALTH CARE EDUCATION/TRAINING PROGRAM

## 2022-05-19 PROCEDURE — 36415 COLL VENOUS BLD VENIPUNCTURE: CPT | Mod: NTX | Performed by: STUDENT IN AN ORGANIZED HEALTH CARE EDUCATION/TRAINING PROGRAM

## 2022-05-19 PROCEDURE — 89051 BODY FLUID CELL COUNT: CPT | Mod: NTX | Performed by: HOSPITALIST

## 2022-05-19 PROCEDURE — 84157 ASSAY OF PROTEIN OTHER: CPT | Mod: NTX | Performed by: HOSPITALIST

## 2022-05-19 PROCEDURE — 25000003 PHARM REV CODE 250: Mod: NTX | Performed by: PHYSICIAN ASSISTANT

## 2022-05-19 PROCEDURE — 25000003 PHARM REV CODE 250: Mod: NTX | Performed by: HOSPITALIST

## 2022-05-19 PROCEDURE — 25000003 PHARM REV CODE 250: Mod: NTX | Performed by: STUDENT IN AN ORGANIZED HEALTH CARE EDUCATION/TRAINING PROGRAM

## 2022-05-19 PROCEDURE — 87075 CULTR BACTERIA EXCEPT BLOOD: CPT | Mod: NTX | Performed by: HOSPITALIST

## 2022-05-19 PROCEDURE — P9047 ALBUMIN (HUMAN), 25%, 50ML: HCPCS | Mod: JG,UD,NTX

## 2022-05-19 PROCEDURE — 86592 SYPHILIS TEST NON-TREP QUAL: CPT | Mod: NTX | Performed by: STUDENT IN AN ORGANIZED HEALTH CARE EDUCATION/TRAINING PROGRAM

## 2022-05-19 PROCEDURE — 86480 TB TEST CELL IMMUN MEASURE: CPT | Mod: NTX | Performed by: STUDENT IN AN ORGANIZED HEALTH CARE EDUCATION/TRAINING PROGRAM

## 2022-05-19 PROCEDURE — 87205 SMEAR GRAM STAIN: CPT | Mod: NTX | Performed by: HOSPITALIST

## 2022-05-19 PROCEDURE — 83735 ASSAY OF MAGNESIUM: CPT | Mod: NTX

## 2022-05-19 PROCEDURE — 99232 PR SUBSEQUENT HOSPITAL CARE,LEVL II: ICD-10-PCS | Mod: NTX,,, | Performed by: HOSPITALIST

## 2022-05-19 PROCEDURE — 12000002 HC ACUTE/MED SURGE SEMI-PRIVATE ROOM: Mod: NTX

## 2022-05-19 PROCEDURE — 87070 CULTURE OTHR SPECIMN AEROBIC: CPT | Mod: NTX | Performed by: HOSPITALIST

## 2022-05-19 PROCEDURE — 85610 PROTHROMBIN TIME: CPT | Mod: NTX | Performed by: STUDENT IN AN ORGANIZED HEALTH CARE EDUCATION/TRAINING PROGRAM

## 2022-05-19 PROCEDURE — 25000003 PHARM REV CODE 250: Mod: NTX

## 2022-05-19 PROCEDURE — 82042 OTHER SOURCE ALBUMIN QUAN EA: CPT | Mod: NTX | Performed by: HOSPITALIST

## 2022-05-19 PROCEDURE — 80053 COMPREHEN METABOLIC PANEL: CPT | Mod: NTX

## 2022-05-19 PROCEDURE — 86787 VARICELLA-ZOSTER ANTIBODY: CPT | Mod: NTX | Performed by: STUDENT IN AN ORGANIZED HEALTH CARE EDUCATION/TRAINING PROGRAM

## 2022-05-19 PROCEDURE — 86682 HELMINTH ANTIBODY: CPT | Mod: NTX | Performed by: STUDENT IN AN ORGANIZED HEALTH CARE EDUCATION/TRAINING PROGRAM

## 2022-05-19 PROCEDURE — 86644 CMV ANTIBODY: CPT | Mod: NTX | Performed by: STUDENT IN AN ORGANIZED HEALTH CARE EDUCATION/TRAINING PROGRAM

## 2022-05-19 PROCEDURE — 83615 LACTATE (LD) (LDH) ENZYME: CPT | Mod: NTX | Performed by: HOSPITALIST

## 2022-05-19 PROCEDURE — 63600175 PHARM REV CODE 636 W HCPCS: Mod: JG,UD,NTX

## 2022-05-19 RX ORDER — LIDOCAINE HYDROCHLORIDE 10 MG/ML
INJECTION INFILTRATION; PERINEURAL CODE/TRAUMA/SEDATION MEDICATION
Status: COMPLETED | OUTPATIENT
Start: 2022-05-19 | End: 2022-05-19

## 2022-05-19 RX ORDER — ALBUMIN HUMAN 250 G/1000ML
SOLUTION INTRAVENOUS
Status: COMPLETED
Start: 2022-05-19 | End: 2022-05-19

## 2022-05-19 RX ORDER — SPIRONOLACTONE 100 MG/1
100 TABLET, FILM COATED ORAL DAILY
Status: ON HOLD | COMMUNITY
Start: 2022-04-21 | End: 2022-05-25 | Stop reason: SDUPTHER

## 2022-05-19 RX ORDER — ENOXAPARIN SODIUM 100 MG/ML
40 INJECTION SUBCUTANEOUS EVERY 24 HOURS
Status: DISCONTINUED | OUTPATIENT
Start: 2022-05-19 | End: 2022-05-25 | Stop reason: HOSPADM

## 2022-05-19 RX ORDER — OXYBUTYNIN CHLORIDE 5 MG/1
5 TABLET ORAL 3 TIMES DAILY
COMMUNITY
Start: 2022-03-16

## 2022-05-19 RX ORDER — CYCLOBENZAPRINE HCL 5 MG
5 TABLET ORAL 3 TIMES DAILY PRN
Status: DISCONTINUED | OUTPATIENT
Start: 2022-05-19 | End: 2022-05-20

## 2022-05-19 RX ORDER — CEFDINIR 300 MG/1
300 CAPSULE ORAL 2 TIMES DAILY
Status: ON HOLD | COMMUNITY
Start: 2022-05-12 | End: 2022-05-25 | Stop reason: HOSPADM

## 2022-05-19 RX ORDER — LINEZOLID 600 MG/1
600 TABLET, FILM COATED ORAL 2 TIMES DAILY
Status: ON HOLD | COMMUNITY
Start: 2022-05-12 | End: 2022-05-25 | Stop reason: HOSPADM

## 2022-05-19 RX ORDER — FLUTICASONE FUROATE AND VILANTEROL 200; 25 UG/1; UG/1
1 POWDER RESPIRATORY (INHALATION) DAILY
Status: DISCONTINUED | OUTPATIENT
Start: 2022-05-19 | End: 2022-05-25 | Stop reason: HOSPADM

## 2022-05-19 RX ORDER — FUROSEMIDE 40 MG/1
40 TABLET ORAL 2 TIMES DAILY
Status: ON HOLD | COMMUNITY
Start: 2022-04-21 | End: 2022-05-25 | Stop reason: SDUPTHER

## 2022-05-19 RX ORDER — PREDNISONE 20 MG/1
40 TABLET ORAL DAILY
Status: ON HOLD | COMMUNITY
Start: 2022-05-12 | End: 2022-05-25 | Stop reason: HOSPADM

## 2022-05-19 RX ORDER — TIOTROPIUM BROMIDE INHALATION SPRAY 3.12 UG/1
2 SPRAY, METERED RESPIRATORY (INHALATION) DAILY
COMMUNITY
Start: 2022-05-12

## 2022-05-19 RX ORDER — BUDESONIDE AND FORMOTEROL FUMARATE DIHYDRATE 160; 4.5 UG/1; UG/1
2 AEROSOL RESPIRATORY (INHALATION) 2 TIMES DAILY
COMMUNITY
Start: 2022-05-12

## 2022-05-19 RX ORDER — PANTOPRAZOLE SODIUM 40 MG/1
40 TABLET, DELAYED RELEASE ORAL DAILY
COMMUNITY
Start: 2022-05-12

## 2022-05-19 RX ADMIN — CYCLOBENZAPRINE HYDROCHLORIDE 5 MG: 5 TABLET, FILM COATED ORAL at 09:05

## 2022-05-19 RX ADMIN — LACTULOSE 30 G: 20 SOLUTION ORAL at 08:05

## 2022-05-19 RX ADMIN — ALBUMIN HUMAN 50 G: 0.25 SOLUTION INTRAVENOUS at 12:05

## 2022-05-19 RX ADMIN — LIDOCAINE HYDROCHLORIDE 10 ML: 10 INJECTION, SOLUTION INFILTRATION; PERINEURAL at 11:05

## 2022-05-19 RX ADMIN — SPIRONOLACTONE 50 MG: 25 TABLET, FILM COATED ORAL at 09:05

## 2022-05-19 RX ADMIN — SIMETHICONE 80 MG: 80 TABLET, CHEWABLE ORAL at 09:05

## 2022-05-19 RX ADMIN — RIFAXIMIN 550 MG: 550 TABLET ORAL at 03:05

## 2022-05-19 RX ADMIN — ENOXAPARIN SODIUM 40 MG: 100 INJECTION SUBCUTANEOUS at 05:05

## 2022-05-19 RX ADMIN — LACTULOSE 30 G: 20 SOLUTION ORAL at 09:05

## 2022-05-19 RX ADMIN — TENOFOVIR DISPROXIL FUMARATE 300 MG: 300 TABLET ORAL at 09:05

## 2022-05-19 RX ADMIN — RIFAXIMIN 550 MG: 550 TABLET ORAL at 08:05

## 2022-05-19 RX ADMIN — PANTOPRAZOLE SODIUM 40 MG: 40 TABLET, DELAYED RELEASE ORAL at 09:05

## 2022-05-19 RX ADMIN — SIMETHICONE 80 MG: 80 TABLET, CHEWABLE ORAL at 05:05

## 2022-05-19 RX ADMIN — FUROSEMIDE 40 MG: 40 TABLET ORAL at 09:05

## 2022-05-19 RX ADMIN — ALBUMIN (HUMAN) 12.5 G: 12.5 SOLUTION INTRAVENOUS at 01:05

## 2022-05-19 NOTE — PLAN OF CARE
Para complete. Pt katelyn well. 10 L off; 62.5 g albumin given per protocol. Pt to transfer back to room with transport via stretcher. Pt A&O, VSS, no c/o pain or distress. Receiving RN updated.

## 2022-05-19 NOTE — SUBJECTIVE & OBJECTIVE
Interval History: c/o significant abdominal distention and uncomfortable. Reports anxiety and anticipates worsening anxiety if we cannot do paracentesis today. Requesting therapeutic paracentesis. MELD is now 15 today and thus he might be a candidate for TIPS now    Current Facility-Administered Medications   Medication    albuterol inhaler 2 puff    dextrose 10% bolus 125 mL    dextrose 10% bolus 250 mL    enoxaparin injection 40 mg    fluticasone furoate-vilanteroL 200-25 mcg/dose diskus inhaler 1 puff    furosemide tablet 40 mg    glucagon (human recombinant) injection 1 mg    glucose chewable tablet 16 g    glucose chewable tablet 24 g    lactulose 20 gram/30 mL solution Soln 30 g    naloxone 0.4 mg/mL injection 0.02 mg    pantoprazole EC tablet 40 mg    rifAXIMin tablet 550 mg    simethicone chewable tablet 80 mg    sodium chloride 0.9% flush 10 mL    spironolactone tablet 50 mg    tenofovir disoproxil fumarate tablet 300 mg    tiotropium bromide 2.5 mcg/actuation inhaler 2 puff       Objective:     Vital Signs (Most Recent):  Temp: 98.2 °F (36.8 °C) (05/19/22 0743)  Pulse: 79 (05/19/22 0743)  Resp: 18 (05/19/22 0743)  BP: (!) 104/59 (05/19/22 0743)  SpO2: 97 % (05/19/22 0743)   Vital Signs (24h Range):  Temp:  [98 °F (36.7 °C)-98.4 °F (36.9 °C)] 98.2 °F (36.8 °C)  Pulse:  [69-98] 79  Resp:  [18-20] 18  SpO2:  [91 %-97 %] 97 %  BP: ()/(58-80) 104/59     Weight: 91 kg (200 lb 9.9 oz) (05/18/22 0118)  Body mass index is 27.21 kg/m².    Physical Exam  Vitals and nursing note reviewed.   Constitutional:       General: He is not in acute distress.     Appearance: He is ill-appearing. He is not toxic-appearing or diaphoretic.      Comments: Pleasant, ill-appearing male lying in bed in NAD.  Temporal wasting  Overall frail   HENT:      Head: Normocephalic and atraumatic.      Nose: Nose normal. No congestion.      Mouth/Throat:      Mouth: Mucous membranes are dry.      Pharynx: Oropharynx is clear.       Comments: Poor dentition  Eyes:      General: Scleral icterus present.      Extraocular Movements: Extraocular movements intact.      Pupils: Pupils are equal, round, and reactive to light.   Cardiovascular:      Rate and Rhythm: Normal rate and regular rhythm.      Pulses: Normal pulses.      Heart sounds: Normal heart sounds. No murmur heard.    No friction rub. No gallop.   Pulmonary:      Effort: Pulmonary effort is normal.      Breath sounds: Normal breath sounds.   Abdominal:      Comments: Distention worse than yesterday. Nontender to palpation. Normoactive bowel sounds.   BM yesterday   Musculoskeletal:      Cervical back: Normal range of motion and neck supple.      Right lower leg: Edema (3+ pitting) present.      Left lower leg: Edema (3+ pitting) present.   Skin:     General: Skin is warm and dry.      Coloration: Skin is jaundiced.   Neurological:      General: No focal deficit present.      Mental Status: He is alert and oriented to person, place, and time.      Comments: No asterixis or tremors noted   Psychiatric:         Mood and Affect: Mood normal.      Comments: Anxious       MELD-Na score: 15 at 5/19/2022  2:57 AM  MELD score: 13 at 5/19/2022  2:57 AM  Calculated from:  Serum Creatinine: 1.1 mg/dL at 5/19/2022  2:57 AM  Serum Sodium: 135 mmol/L at 5/19/2022  2:57 AM  Total Bilirubin: 2.5 mg/dL at 5/19/2022  2:57 AM  INR(ratio): 1.2 at 5/19/2022  2:57 AM  Age: 56 years    Significant Labs:  Labs within the past month have been reviewed.    Significant Imaging:  Reviewed

## 2022-05-19 NOTE — PROCEDURES
Radiology Post-Procedure Note    Pre Op Diagnosis: Ascites  Post Op Diagnosis: Same    Procedure: Ultrasound Guided Paracentesis    Procedure performed by: Mamie Ling PA-C    Written Informed Consent Obtained: Yes  Specimen Removed: YES cloudy, estefania  Estimated Blood Loss: Minimal    Findings:   Successful paracentesis.  LLQ. Albumin administered PRN per protocol.    Patient tolerated procedure well.    Mamie Ling PA-C  Interventional Radiology  Clinic [phone]

## 2022-05-19 NOTE — PROGRESS NOTES
Inpatient Radiology Pre-procedure Note    History of Present Illness:  Nic Munoz is a 56 y.o. male who presents for ultrasound guided paracentesis.  Admission H&P reviewed.  No past medical history on file.  No past surgical history on file.    Review of Systems:   As documented in primary team H&P    Home Meds:   Prior to Admission medications    Medication Sig Start Date End Date Taking? Authorizing Provider   fluticasone furoate-vilanteroL (BREO) 200-25 mcg/dose DsDv diskus inhaler Inhale 1 puff into the lungs once daily. 5/12/22   Historical Provider   furosemide (LASIX) 40 MG tablet Take 40 mg by mouth 2 (two) times daily. 4/21/22   Historical Provider   lactulose (CHRONULAC) 10 gram/15 mL solution Take 30 mLs by mouth 2 (two) times daily. 5/12/22   Historical Provider   oxybutynin (DITROPAN) 5 MG Tab Take 5 mg by mouth 3 (three) times daily. 3/16/22   Historical Provider   pantoprazole (PROTONIX) 40 MG tablet Take 40 mg by mouth once daily. 5/12/22   Historical Provider   SPIRIVA RESPIMAT 2.5 mcg/actuation inhaler Inhale 2 puffs into the lungs once daily. 5/12/22   Historical Provider   spironolactone (ALDACTONE) 100 MG tablet Take 100 mg by mouth once daily. 4/21/22   Historical Provider   SYMBICORT 160-4.5 mcg/actuation HFAA Inhale 2 puffs into the lungs 2 (two) times daily. 5/12/22   Historical Provider   tenofovir disoproxil fumarate (VIREAD) 300 mg Tab Take 300 mg by mouth. 1/28/22 1/28/23  Historical Provider     Scheduled Meds:   Continuous Infusions:   PRN Meds:  Anticoagulants/Antiplatelets: no anticoagulation    Allergies: Review of patient's allergies indicates:  No Known Allergies  Sedation Hx: have not been any systemic reactions    Vitals:        Physical Exam:  ASA: 3  Mallampati: n/a    General: no acute distress  Mental Status: alert and oriented to person, place and time  HEENT: normocephalic, atraumatic  Chest: unlabored breathing  Heart: regular heart rate  Abdomen:  distended  Extremity: moves all extremities    Plan: ultrasound guided paracentesis  Sedation Plan: local    Mamie Ling PA-C  Interventional Radiology  Clinic [phone]

## 2022-05-19 NOTE — PROGRESS NOTES
[facility]  Hepatology  Progress Note    Patient Name: Nic Munoz  MRN: [id number]  Admission Date: 5/17/2022  Hospital Length of Stay: 0 days  Attending Provider: Zayda Guerrero MD   Primary Care Physician: Primary Doctor No  Principal Problem:Alcoholic cirrhosis of liver with ascites    Subjective:     Transplant status: Pre-transplant    HPI: 55 y/o M hx of alcoholic cirrhosis, HTN, hepatitis B (on tenofovir) is being admitted from hepatology clinic for inpatient transplant evaluation for decompensated alcoholic cirrhosis. Pt comes from [place]. Pt reports long history of alcoholic cirrhosis (last quit alcohol 4 years ago); he states he has been receiving regular large-volume paracenteses for the past 10 months (before yesterday last one on 5/12 when he was admitted at [facility] for HE). On discharge -he was referred last week to [facility] transplant team for a TIPS EVAL. He was evaluated in clinic day of admission (5/17) with Dr. Yan and was deemed not a candidate for TIPS currently given MELD of 20. He underwent a paracentesis with IR again on 5/17 and 5L was removed and then was directed to the [facility] ED for admission to the hospital for transplant workup. On interview pt reports feeling significantly better post-para. He denies any current acute complaints. He denies fever, chills, nausea, vomiting. Abdominal pain improved post-para. He does report significant LE swelling.     In the ED, Pt HDS, afebrile, /74, HR 70s. CBC without leukocytosis, mild anemia with hgb 13.2, Plt 132. CMP notable for Na 131, Cr elevated to 1.7 (from baseline 1.0). Ammonia 70. INR 1.3. paracentesis labs pending at time of admission.     Last bowel movement 5 days ago, no overt signs of GIB, no abdominal pain/tenderness. SBP labs negative from 5/17. Again abdominal distention this AM. MELD down to 17 now as BC resolved. Hgb from 15 down to 12 today.      Last Colonoscopy - 6 months ago at  [facility] (per patient)      Interval History: c/o significant abdominal distention and uncomfortable. Reports anxiety and anticipates worsening anxiety if we cannot do paracentesis today. Requesting therapeutic paracentesis. MELD is now 15 today and thus he might be a candidate for TIPS now    Current Facility-Administered Medications   Medication    albuterol inhaler 2 puff    dextrose 10% bolus 125 mL    dextrose 10% bolus 250 mL    enoxaparin injection 40 mg    fluticasone furoate-vilanteroL 200-25 mcg/dose diskus inhaler 1 puff    furosemide tablet 40 mg    glucagon (human recombinant) injection 1 mg    glucose chewable tablet 16 g    glucose chewable tablet 24 g    lactulose 20 gram/30 mL solution Soln 30 g    naloxone 0.4 mg/mL injection 0.02 mg    pantoprazole EC tablet 40 mg    rifAXIMin tablet 550 mg    simethicone chewable tablet 80 mg    sodium chloride 0.9% flush 10 mL    spironolactone tablet 50 mg    tenofovir disoproxil fumarate tablet 300 mg    tiotropium bromide 2.5 mcg/actuation inhaler 2 puff       Objective:     Vital Signs (Most Recent):  Temp: 98.2 °F (36.8 °C) (05/19/22 0743)  Pulse: 79 (05/19/22 0743)  Resp: 18 (05/19/22 0743)  BP: (!) 104/59 (05/19/22 0743)  SpO2: 97 % (05/19/22 0743)   Vital Signs (24h Range):  Temp:  [98 °F (36.7 °C)-98.4 °F (36.9 °C)] 98.2 °F (36.8 °C)  Pulse:  [69-98] 79  Resp:  [18-20] 18  SpO2:  [91 %-97 %] 97 %  BP: ()/(58-80) 104/59     Weight: 91 kg (200 lb 9.9 oz) (05/18/22 0118)  Body mass index is 27.21 kg/m².    Physical Exam  Vitals and nursing note reviewed.   Constitutional:       General: He is not in acute distress.     Appearance: He is ill-appearing. He is not toxic-appearing or diaphoretic.      Comments: Pleasant, ill-appearing male lying in bed in NAD.  Temporal wasting  Overall frail   HENT:      Head: Normocephalic and atraumatic.      Nose: Nose normal. No congestion.      Mouth/Throat:      Mouth: Mucous  membranes are dry.      Pharynx: Oropharynx is clear.      Comments: Poor dentition  Eyes:      General: Scleral icterus present.      Extraocular Movements: Extraocular movements intact.      Pupils: Pupils are equal, round, and reactive to light.   Cardiovascular:      Rate and Rhythm: Normal rate and regular rhythm.      Pulses: Normal pulses.      Heart sounds: Normal heart sounds. No murmur heard.    No friction rub. No gallop.   Pulmonary:      Effort: Pulmonary effort is normal.      Breath sounds: Normal breath sounds.   Abdominal:      Comments: Distention worse than yesterday. Nontender to palpation. Normoactive bowel sounds.   BM yesterday   Musculoskeletal:      Cervical back: Normal range of motion and neck supple.      Right lower leg: Edema (3+ pitting) present.      Left lower leg: Edema (3+ pitting) present.   Skin:     General: Skin is warm and dry.      Coloration: Skin is jaundiced.   Neurological:      General: No focal deficit present.      Mental Status: He is alert and oriented to person, place, and time.      Comments: No asterixis or tremors noted   Psychiatric:         Mood and Affect: Mood normal.      Comments: Anxious       MELD-Na score: 15 at 5/19/2022  2:57 AM  MELD score: 13 at 5/19/2022  2:57 AM  Calculated from:  Serum Creatinine: 1.1 mg/dL at 5/19/2022  2:57 AM  Serum Sodium: 135 mmol/L at 5/19/2022  2:57 AM  Total Bilirubin: 2.5 mg/dL at 5/19/2022  2:57 AM  INR(ratio): 1.2 at 5/19/2022  2:57 AM  Age: 56 years    Significant Labs:  Labs within the past month have been reviewed.    Significant Imaging:  Reviewed    Assessment/Plan:     * Alcoholic cirrhosis of liver with ascites  56M with alcoholic cirrhosis decompensated by recurrent ascites and recent HE is being admitted for inpatient transplant evaluation for elevated MELD.   Not a candidate for TIPS due to high MELD and poor outcomes  Noted to have an BC which primary team has been treating for HRS with albumin, octreotide  and midodrine.  SBP labs negative from para fluid 5/17  US Liver doppler consistent with cirrhosis with portal hypertension sequelae    MELD-Na score: 15 at 5/19/2022  2:57 AM  MELD score: 13 at 5/19/2022  2:57 AM  Calculated from:  Serum Creatinine: 1.1 mg/dL at 5/19/2022  2:57 AM  Serum Sodium: 135 mmol/L at 5/19/2022  2:57 AM  Total Bilirubin: 2.5 mg/dL at 5/19/2022  2:57 AM  INR(ratio): 1.2 at 5/19/2022  2:57 AM  Age: 56 years    Recommendations:  - Continue lactulose  - Can resume diuretics today, BC has resolved  - Will begin inpatient transplant eval  - Will need addiction psych eval  - Consult IR for TIPS evaluation, MELD 15 today  - Therapeutic paracentesis for symptom relief  - Daily CMP, INR, CBC  - f/u HBV and HCV serologies, DNA/RNA  - f/u PETH          Thank you for your consult. I will follow-up with patient. Please contact us if you have any additional questions.    Kevin Walker MD  Hepatology  [facility]   Detail Level: Detailed Detail Level: Zone

## 2022-05-19 NOTE — PHARMACY MED REC
"Admission Medication History     The home medication history was taken by Jenifer Alves.    You may go to "Admission" then "Reconcile Home Medications" tabs to review and/or act upon these items.      The home medication list has been updated by the Pharmacy department.    Please read ALL comments highlighted in yellow.    Please address this information as you see fit.     Feel free to contact us if you have any questions or require assistance.    Medications listed below were obtained from: Patient/family, [employer] and Patient's pharmacy  PTA Medications   Medication Sig    fluticasone furoate-vilanteroL (BREO) 200-25 mcg/dose DsDv diskus inhaler   Inhale 1 puff into the lungs once daily.    tenofovir disoproxil fumarate (VIREAD) 300 mg Tab   Take 300 mg by mouth once daily.    [] cefdinir (OMNICEF) 300 MG capsule   Take 300 mg by mouth 2 (two) times daily.    furosemide (LASIX) 40 MG tablet   Take 40 mg by mouth 2 (two) times daily.    lactulose (CHRONULAC) 10 gram/15 mL solution   Take 30 mLs by mouth 2 (two) times daily.    oxybutynin (DITROPAN) 5 MG Tab   Take 5 mg by mouth 3 (three) times daily.    pantoprazole (PROTONIX) 40 MG tablet   Take 40 mg by mouth once daily.    [] predniSONE (DELTASONE) 20 MG tablet   Take 40 mg by mouth once daily.    SPIRIVA RESPIMAT 2.5 mcg/actuation inhaler   Inhale 2 puffs into the lungs once daily.    spironolactone (ALDACTONE) 100 MG tablet   Take 100 mg by mouth once daily.    SYMBICORT 160-4.5 mcg/actuation HFAA   Inhale 2 puffs into the lungs 2 (two) times daily.    [] ZYVOX 600 mg Tab Take 600 mg by mouth 2 (two) times daily.       Potential issues to be addressed PRIOR TO DISCHARGE  Please discuss with the patient barriers to adherence with medication treatment plans  Patient requires education regarding drug therapies     Jenifer Alves  EXT [id number]                  .        "

## 2022-05-19 NOTE — PROGRESS NOTES
Progress Note  Hospital Medicine      Patient Name: Nic Munoz  MRN: [id number]  Patient Class: IP         Admission Date: 5/17/2022  Attending Physician: Zayda Guerrero   Primary Care Provider: Primary Doctor No    SUBJECTIVE:     Follow-up For:  Alcoholic cirrhosis of liver with ascites     Interval history/ROS:   5/19: at paracentesis today during  Rounds, DSE for AM.     HPI:  57 y/o M hx of alcoholic cirrhosis, HTN, hepatitis B on antiviral therapy [place] [facility] [place] possible [place]. Pt comes from [place], [place]. Pt reports long history of cirrhosis; he states he has been receiving regular large-volume paracenteses for the past 10 months and he says they have been steadily increasing in frequency. He was referred last week to [facility] transplant team for a TIPS EVAL. He was evaluated in clinic day of admission with Dr. Yan and was deemed not a candidate for TIPS currently given MELD of 20. He underwent a paracentesis and then was [place] [facility] [place] to the hospital for transplant workup. On interview pt reports feeling significantly better post-para. He denies any current acute complaints. He denies fever, chills, nausea, vomiting. Abdominal pain improved post-para. He does report significant LE swelling.      In the ED, Pt HDS, afebrile, /74, HR 70s. CBC without leukocytosis, mild anemia with hgb 13.2, Plt 132. CMP notable for Na 131, Cr elevated to 1.7 (from baseline 1.0). Ammonia 70. INR 1.3. paracentesis labs pending at time of admission.         Overview/Hospital Course:  Patient admitted for management of BC, decompensated cirrhosis, and hepatology eval. Pt given lactulose and ctx for SBP prophylaxis. Midodrine, octreotide, albumin given for likely HRS. Patient continued on home Tenofovir for HBV. Hepatology consulted on admission. The following day patient's Cr improving. CTAP demonstrating nonobstructive renal calculi, no  hydronephrosis. Paracentesis labs not c/w SBP, so Ctx discontinued. MELD improving. Will f/u hepatology recs. Pt taken off HRS protocol on 5/18 per hepatology. Hepatology will start tx workup. Anticipate transfer to L team when space available.        OBJECTIVE:     Body mass index is 27.21 kg/m².    Vital Signs Range (Last 24H):  Temp:  [98 °F (36.7 °C)-98.4 °F (36.9 °C)]   Pulse:  [70-98]   Resp:  [18-28]   BP: (103-137)/(58-91)   SpO2:  [93 %-97 %]     I & O (Last 24H):    Intake/Output Summary (Last 24 hours) at 5/19/2022 1509  Last data filed at 5/19/2022 1304  Gross per 24 hour   Intake 610 ml   Output 39966 ml   Net -9390 ml        Physical Exam:  Vitals and nursing note reviewed.   Constitutional:       General: He is not in acute distress.     Appearance: He is ill-appearing. He is not toxic-appearing or diaphoretic.      Comments: Pleasant, ill-appearing male lying in bed in NAD.   HENT:      Head: Normocephalic and atraumatic.      Nose: Nose normal. No congestion.      Mouth/Throat:      Mouth: Mucous membranes are dry.      Pharynx: Oropharynx is clear.      Comments: Poor dentition  Eyes:      General: Scleral icterus present.      Extraocular Movements: Extraocular movements intact.      Pupils: Pupils are equal, round, and reactive to light.   Cardiovascular:      Rate and Rhythm: Normal rate and regular rhythm.      Pulses: Normal pulses.      Heart sounds: Normal heart sounds. No murmur heard.    No friction rub. No gallop.   Pulmonary:      Effort: Pulmonary effort is normal.      Breath sounds: Normal breath sounds.   Abdominal:      Comments: Abdomen distended. Nontender to palpation. Normoactive bowel sounds.    Musculoskeletal:      Cervical back: Normal range of motion and neck supple.      Right lower leg: Edema (3+ pitting) present.      Left lower leg: Edema (3+ pitting) present.   Skin:     General: Skin is warm and dry.      Coloration: Skin is jaundiced.   Neurological:      General:  No focal deficit present.      Mental Status: He is alert and oriented to person, place, and time.      Comments: No asterixis or tremors noted   Psychiatric:         Mood and Affect: Mood normal.         Behavior: Behavior normal.        Recent Labs   Lab 05/17/22 1519 05/18/22 0415 05/19/22 0257   * 133* 135*   K 3.8 3.9 3.7    102 105   CO2 22* 24 22*   BUN 36* 31* 26*   CREATININE 1.7* 1.3 1.1   * 95 138*   CALCIUM 8.1* 8.3* 8.7   MG  --  1.9 1.9     Recent Labs   Lab 05/17/22 1519 05/18/22 0415 05/19/22  0257   ALKPHOS 137* 115 125   ALT 32 25 27   AST 51* 34 40   ALBUMIN 2.4* 3.1* 2.8*   PROT 6.1 6.0 6.2   BILITOT 1.2* 1.8* 2.5*   INR 1.3* 1.3* 1.2       Recent Labs   Lab 05/17/22  1117 05/17/22 1519 05/18/22 0415 05/19/22 0257   WBC  --  8.55 6.59 8.73   HGB  --  13.2* 12.0* 13.8*   HCT  --  39.4* 36.6* 41.8   PLT  --  132* 93* 100*   GRAN  --  64.9  5.6 67.3  4.4 75.4*  6.6   SEGS 30  --   --   --    LYMPH  --  13.9*  1.2 14.0*  0.9* 10.0*  0.9*   LYMPHS 61  --   --   --    MONO  --  16.5*  1.4* 15.3*  1.0 12.3  1.1*       No results for input(s): POCTGLUCOSE in the last 168 hours.    No results for input(s): TROPONINI in the last 168 hours.    Diagnostic Results:  Labs: Reviewed    enoxaparin, 40 mg, Subcutaneous, Daily  fluticasone furoate-vilanteroL, 1 puff, Inhalation, Daily  furosemide, 40 mg, Oral, Daily  lactulose, 30 g, Oral, TID  pantoprazole, 40 mg, Oral, Daily  rifAXImin, 550 mg, Oral, BID  spironolactone, 50 mg, Oral, Daily  tenofovir disoproxil fumarate, 300 mg, Oral, Daily  tiotropium bromide, 2 puff, Inhalation, Daily        As Needed albuterol, dextrose 10%, dextrose 10%, glucagon (human recombinant), glucose, glucose, naloxone, simethicone, sodium chloride 0.9%    ASSESSMENT/PLAN:     Assessment: Nic Munoz is a 56 y.o. male here with:     Active Hospital Problems    Diagnosis  POA    *Alcoholic cirrhosis of liver with ascites [K70.31]  Yes    BC  (acute kidney injury) [N17.9]  Unknown    Ureteral stent retained [Z96.0]  Not Applicable    Esophageal varices without bleeding [I85.00]  Unknown    COPD not affecting current episode of care [J44.9]  Unknown    HTN (hypertension) [I10]  Yes    Chronic hepatitis B with cirrhosis [B18.1, K74.60]  Yes      Resolved Hospital Problems   No resolved problems to display.        Plan:     Alcoholic cirrhosis of liver with ascites    MELD-Na score: 15 at 5/19/2022  2:57 AM  MELD score: 13 at 5/19/2022  2:57 AM  Calculated from:  Serum Creatinine: 1.1 mg/dL at 5/19/2022  2:57 AM  Serum Sodium: 135 mmol/L at 5/19/2022  2:57 AM  Total Bilirubin: 2.5 mg/dL at 5/19/2022  2:57 AM  INR(ratio): 1.2 at 5/19/2022  2:57 AM  Age: 56 years  57 y/o M hx of alcoholic cirrhosis, Hep B cirrhosis, HTN presents from hepatology clinic with decompensated cirrhosis and for tx eval. Pt underwent paracentesis in clinic with labs sent. Patient afebrile, HDS and in no distress on admission. Hepatology consulted, appreciate their input.      Pt to be transferred to [facility] when space available per hepatology. Will continue tx eval and workup.      Plan:  -- current meld 17  -- hepatology consulted on admission  -- lactulose 30 TID; titrate to 3 BM per day  -- discontinuing HRS protocol per hepatology on 5/18  -- will resume low-dose lasix, spironolactone on 5/19 am  -- D/C ctx as low suspicion for SBP and para studies negative for SBP  -- US liver w/ doppler ordered  -- daily CBC, CMP, INR        Esophageal varices without bleeding  Last EGD 2/2022 at OSH:  Grade I varices were found in the lower third of the esophagus. Scarring noted from prior banding. Moderate portal hypertensive gastropathy was found in the entire examined stomach. Diffuse moderately erythematous mucosa without active bleeding and with   no stigmata of bleeding was found in the duodenal bulb.   Was recommended for Inderal LA 80mg daily, Lasix 40 BID, Aldactone 100mg BID, and  Protonix 40mg daily  F/u 3 months and US portal vein doppler (not in CareEverywhere)                  - resuming low-dose lasix and spironolactone on 5/19           Ureteral stent retained           BC (acute kidney injury)  Patient with acute kidney injury likely d/t Pre-renal azotemia . Labs reviewed- Renal function/electrolytes with Estimated Creatinine Clearance: 59.6 mL/min (A) (based on SCr of 1.7 mg/dL (H)). according to latest data. Monitor urine output and serial BMP and adjust therapy as needed. Avoid nephrotoxins and renally dose meds for GFR listed above.      Concern for HRS given current decompensated liver cirrrhosis vs volume depletion s/o poor PO intake, though high suspicion at this time for HRS. On 5/18 pt Cr improving to 1.3, ok to discontinue HRS protocol per hepatology.      -- resume low-dose lasix, spironolactone on 5/19 am  -- discontinue albumin, octreotide, midodrine  -- avoid nephrotoxic medications  -- renally dose meds  -- CT AP with nonobstructive renal calculi, no hydronephrosis        Chronic hepatitis B with cirrhosis  -- continue Tenofovir 300 mg qd           HTN (hypertension)  Patient reports history of HTN, not currently on any antihypertensive medications. Patient normotensive in ED. Softer pressures while admitted. Will continue to monitor.             HIGH RISK CONDITION(S):  Patient has a condition that poses threat to life and bodily function: Acute Renal Failure          Zayda Guerrero MD

## 2022-05-20 ENCOUNTER — TELEPHONE (OUTPATIENT)
Dept: INTERNAL MEDICINE | Facility: CLINIC | Age: 57
End: 2022-05-20
Payer: MEDICAID

## 2022-05-20 PROBLEM — F19.90 IVDU (INTRAVENOUS DRUG USER): Status: ACTIVE | Noted: 2022-05-20

## 2022-05-20 PROBLEM — F15.20 AMPHETAMINE USE DISORDER, SEVERE: Chronic | Status: ACTIVE | Noted: 2022-05-20

## 2022-05-20 PROBLEM — F10.21 ALCOHOL USE DISORDER, SEVERE, IN SUSTAINED REMISSION: Chronic | Status: ACTIVE | Noted: 2022-05-20

## 2022-05-20 LAB
ALBUMIN SERPL BCP-MCNC: 3.2 G/DL (ref 3.5–5.2)
ALP SERPL-CCNC: 122 U/L (ref 55–135)
ALT SERPL W/O P-5'-P-CCNC: 23 U/L (ref 10–44)
ANION GAP SERPL CALC-SCNC: 11 MMOL/L (ref 8–16)
ASCENDING AORTA: 2.92 CM
AST SERPL-CCNC: 37 U/L (ref 10–40)
BACTERIA SPEC AEROBE CULT: NO GROWTH
BASOPHILS # BLD AUTO: 0.03 K/UL (ref 0–0.2)
BASOPHILS NFR BLD: 0.3 % (ref 0–1.9)
BILIRUB SERPL-MCNC: 2.2 MG/DL (ref 0.1–1)
BSA FOR ECHO PROCEDURE: 2.15 M2
BUN SERPL-MCNC: 25 MG/DL (ref 6–20)
CALCIUM SERPL-MCNC: 8.5 MG/DL (ref 8.7–10.5)
CHLORIDE SERPL-SCNC: 102 MMOL/L (ref 95–110)
CMV IGG SERPL QL IA: REACTIVE
CO2 SERPL-SCNC: 21 MMOL/L (ref 23–29)
CREAT SERPL-MCNC: 1 MG/DL (ref 0.5–1.4)
CV ECHO LV RWT: 0.44 CM
CV STRESS BASE HR: 94 BPM
DIASTOLIC BLOOD PRESSURE: 79 MMHG
DIFFERENTIAL METHOD: ABNORMAL
DOP CALC LVOT AREA: 4.5 CM2
DOP CALC LVOT DIAMETER: 2.4 CM
DOP CALC LVOT PEAK VEL: 1.14 M/S
DOP CALC LVOT STROKE VOLUME: 78.54 CM3
DOP CALCLVOT PEAK VEL VTI: 17.37 CM
E WAVE DECELERATION TIME: 169.98 MSEC
E/A RATIO: 0.84
E/E' RATIO: 7.16 M/S
EBV VCA IGG SER QL IA: POSITIVE
ECHO LV POSTERIOR WALL: 0.8 CM (ref 0.6–1.1)
EJECTION FRACTION: 55 %
EOSINOPHIL # BLD AUTO: 0.1 K/UL (ref 0–0.5)
EOSINOPHIL NFR BLD: 1.4 % (ref 0–8)
ERYTHROCYTE [DISTWIDTH] IN BLOOD BY AUTOMATED COUNT: 14.3 % (ref 11.5–14.5)
EST. GFR  (AFRICAN AMERICAN): >60 ML/MIN/1.73 M^2
EST. GFR  (NON AFRICAN AMERICAN): >60 ML/MIN/1.73 M^2
FRACTIONAL SHORTENING: 39 % (ref 28–44)
GAMMA INTERFERON BACKGROUND BLD IA-ACNC: 0.05 IU/ML
GLUCOSE SERPL-MCNC: 95 MG/DL (ref 70–110)
HCT VFR BLD AUTO: 39 % (ref 40–54)
HGB BLD-MCNC: 13.3 G/DL (ref 14–18)
IMM GRANULOCYTES # BLD AUTO: 0.03 K/UL (ref 0–0.04)
IMM GRANULOCYTES NFR BLD AUTO: 0.3 % (ref 0–0.5)
INR PPP: 1.3 (ref 0.8–1.2)
INTERVENTRICULAR SEPTUM: 0.88 CM (ref 0.6–1.1)
IVRT: 110.37 MSEC
LA MAJOR: 5.34 CM
LA MINOR: 5.63 CM
LA WIDTH: 3.81 CM
LEFT ATRIUM SIZE: 3.92 CM
LEFT ATRIUM VOLUME INDEX MOD: 19.7 ML/M2
LEFT ATRIUM VOLUME INDEX: 32.7 ML/M2
LEFT ATRIUM VOLUME MOD: 41.91 CM3
LEFT ATRIUM VOLUME: 69.58 CM3
LEFT INTERNAL DIMENSION IN SYSTOLE: 2.19 CM (ref 2.1–4)
LEFT VENTRICLE DIASTOLIC VOLUME INDEX: 25.81 ML/M2
LEFT VENTRICLE DIASTOLIC VOLUME: 54.97 ML
LEFT VENTRICLE MASS INDEX: 40 G/M2
LEFT VENTRICLE SYSTOLIC VOLUME INDEX: 7.5 ML/M2
LEFT VENTRICLE SYSTOLIC VOLUME: 16.08 ML
LEFT VENTRICULAR INTERNAL DIMENSION IN DIASTOLE: 3.61 CM (ref 3.5–6)
LEFT VENTRICULAR MASS: 84.62 G
LV LATERAL E/E' RATIO: 5.23 M/S
LV SEPTAL E/E' RATIO: 11.33 M/S
LYMPHOCYTES # BLD AUTO: 0.9 K/UL (ref 1–4.8)
LYMPHOCYTES NFR BLD: 10.6 % (ref 18–48)
M TB IFN-G CD4+ BCKGRND COR BLD-ACNC: -0 IU/ML
MAGNESIUM SERPL-MCNC: 1.7 MG/DL (ref 1.6–2.6)
MCH RBC QN AUTO: 29.9 PG (ref 27–31)
MCHC RBC AUTO-ENTMCNC: 34.1 G/DL (ref 32–36)
MCV RBC AUTO: 88 FL (ref 82–98)
MITOGEN IGNF BCKGRD COR BLD-ACNC: 9.95 IU/ML
MONOCYTES # BLD AUTO: 1.1 K/UL (ref 0.3–1)
MONOCYTES NFR BLD: 12.4 % (ref 4–15)
MV PEAK A VEL: 0.81 M/S
MV PEAK E VEL: 0.68 M/S
MV STENOSIS PRESSURE HALF TIME: 49.29 MS
MV VALVE AREA P 1/2 METHOD: 4.46 CM2
NEUTROPHILS # BLD AUTO: 6.5 K/UL (ref 1.8–7.7)
NEUTROPHILS NFR BLD: 75 % (ref 38–73)
NRBC BLD-RTO: 0 /100 WBC
OHS CV CPX 1 MINUTE RECOVERY HEART RATE: 151 BPM
OHS CV CPX 85 PERCENT MAX PREDICTED HEART RATE MALE: 140
OHS CV CPX MAX PREDICTED HEART RATE: 165
OHS CV CPX PATIENT IS FEMALE: 0
OHS CV CPX PATIENT IS MALE: 1
OHS CV CPX PEAK DIASTOLIC BLOOD PRESSURE: 53 MMHG
OHS CV CPX PEAK HEAR RATE: 155 BPM
OHS CV CPX PEAK RATE PRESSURE PRODUCT: NORMAL
OHS CV CPX PEAK SYSTOLIC BLOOD PRESSURE: 85 MMHG
OHS CV CPX PERCENT MAX PREDICTED HEART RATE ACHIEVED: 94
OHS CV CPX RATE PRESSURE PRODUCT PRESENTING: NORMAL
PISA TR MAX VEL: 2.7 M/S
PLATELET # BLD AUTO: 86 K/UL (ref 150–450)
PMV BLD AUTO: 10.3 FL (ref 9.2–12.9)
POTASSIUM SERPL-SCNC: 4.1 MMOL/L (ref 3.5–5.1)
PROT SERPL-MCNC: 5.9 G/DL (ref 6–8.4)
PROTHROMBIN TIME: 12.9 SEC (ref 9–12.5)
RA MAJOR: 4.44 CM
RA WIDTH: 3.39 CM
RBC # BLD AUTO: 4.45 M/UL (ref 4.6–6.2)
RIGHT VENTRICULAR END-DIASTOLIC DIMENSION: 3.09 CM
RPR SER QL: NORMAL
RV TISSUE DOPPLER FREE WALL SYSTOLIC VELOCITY 1 (APICAL 4 CHAMBER VIEW): 16.76 CM/S
SINUS: 3.16 CM
SODIUM SERPL-SCNC: 134 MMOL/L (ref 136–145)
STJ: 2.81 CM
SYSTOLIC BLOOD PRESSURE: 134 MMHG
TB GOLD PLUS: NEGATIVE
TB2 - NIL: -0.01 IU/ML
TDI LATERAL: 0.13 M/S
TDI SEPTAL: 0.06 M/S
TDI: 0.1 M/S
TR MAX PG: 29 MMHG
TRICUSPID ANNULAR PLANE SYSTOLIC EXCURSION: 2.28 CM
WBC # BLD AUTO: 8.62 K/UL (ref 3.9–12.7)

## 2022-05-20 PROCEDURE — 80053 COMPREHEN METABOLIC PANEL: CPT | Mod: NTX

## 2022-05-20 PROCEDURE — 25000003 PHARM REV CODE 250: Mod: NTX | Performed by: STUDENT IN AN ORGANIZED HEALTH CARE EDUCATION/TRAINING PROGRAM

## 2022-05-20 PROCEDURE — 80307 DRUG TEST PRSMV CHEM ANLYZR: CPT | Mod: NTX | Performed by: PSYCHIATRY & NEUROLOGY

## 2022-05-20 PROCEDURE — 99223 1ST HOSP IP/OBS HIGH 75: CPT | Mod: NTX,,, | Performed by: PSYCHIATRY & NEUROLOGY

## 2022-05-20 PROCEDURE — 84300 ASSAY OF URINE SODIUM: CPT | Mod: NTX

## 2022-05-20 PROCEDURE — 63600150 PHARM REV CODE 636: Mod: NTX | Performed by: HOSPITALIST

## 2022-05-20 PROCEDURE — 83935 ASSAY OF URINE OSMOLALITY: CPT | Mod: NTX | Performed by: STUDENT IN AN ORGANIZED HEALTH CARE EDUCATION/TRAINING PROGRAM

## 2022-05-20 PROCEDURE — 85025 COMPLETE CBC W/AUTO DIFF WBC: CPT | Mod: NTX

## 2022-05-20 PROCEDURE — 12000002 HC ACUTE/MED SURGE SEMI-PRIVATE ROOM: Mod: NTX

## 2022-05-20 PROCEDURE — 63600175 PHARM REV CODE 636 W HCPCS: Mod: NTX | Performed by: HOSPITALIST

## 2022-05-20 PROCEDURE — 25000003 PHARM REV CODE 250: Mod: NTX | Performed by: HOSPITALIST

## 2022-05-20 PROCEDURE — 83735 ASSAY OF MAGNESIUM: CPT | Mod: NTX

## 2022-05-20 PROCEDURE — 25000003 PHARM REV CODE 250: Mod: NTX

## 2022-05-20 PROCEDURE — 99223 PR INITIAL HOSPITAL CARE,LEVL III: ICD-10-PCS | Mod: NTX,,, | Performed by: PSYCHIATRY & NEUROLOGY

## 2022-05-20 PROCEDURE — 85610 PROTHROMBIN TIME: CPT | Mod: NTX | Performed by: STUDENT IN AN ORGANIZED HEALTH CARE EDUCATION/TRAINING PROGRAM

## 2022-05-20 PROCEDURE — 36415 COLL VENOUS BLD VENIPUNCTURE: CPT | Mod: NTX | Performed by: STUDENT IN AN ORGANIZED HEALTH CARE EDUCATION/TRAINING PROGRAM

## 2022-05-20 PROCEDURE — 84156 ASSAY OF PROTEIN URINE: CPT | Mod: NTX | Performed by: STUDENT IN AN ORGANIZED HEALTH CARE EDUCATION/TRAINING PROGRAM

## 2022-05-20 PROCEDURE — 25000242 PHARM REV CODE 250 ALT 637 W/ HCPCS: Mod: NTX | Performed by: HOSPITALIST

## 2022-05-20 RX ORDER — DOBUTAMINE HYDROCHLORIDE 100 MG/100ML
10 INJECTION INTRAVENOUS
Status: COMPLETED | OUTPATIENT
Start: 2022-05-20 | End: 2022-05-20

## 2022-05-20 RX ORDER — CYCLOBENZAPRINE HCL 5 MG
10 TABLET ORAL 3 TIMES DAILY PRN
Status: DISCONTINUED | OUTPATIENT
Start: 2022-05-20 | End: 2022-05-25 | Stop reason: HOSPADM

## 2022-05-20 RX ORDER — ATROPINE SULFATE 0.1 MG/ML
0.4 INJECTION INTRAVENOUS ONCE
Status: COMPLETED | OUTPATIENT
Start: 2022-05-20 | End: 2022-05-20

## 2022-05-20 RX ADMIN — PANTOPRAZOLE SODIUM 40 MG: 40 TABLET, DELAYED RELEASE ORAL at 12:05

## 2022-05-20 RX ADMIN — ATROPINE SULFATE 0.4 MG: 0.1 INJECTION PARENTERAL at 10:05

## 2022-05-20 RX ADMIN — FUROSEMIDE 40 MG: 40 TABLET ORAL at 12:05

## 2022-05-20 RX ADMIN — TENOFOVIR DISPROXIL FUMARATE 300 MG: 300 TABLET ORAL at 12:05

## 2022-05-20 RX ADMIN — RIFAXIMIN 550 MG: 550 TABLET ORAL at 08:05

## 2022-05-20 RX ADMIN — SIMETHICONE 80 MG: 80 TABLET, CHEWABLE ORAL at 03:05

## 2022-05-20 RX ADMIN — RIFAXIMIN 550 MG: 550 TABLET ORAL at 12:05

## 2022-05-20 RX ADMIN — CYCLOBENZAPRINE HYDROCHLORIDE 10 MG: 5 TABLET, FILM COATED ORAL at 12:05

## 2022-05-20 RX ADMIN — SPIRONOLACTONE 50 MG: 25 TABLET, FILM COATED ORAL at 12:05

## 2022-05-20 RX ADMIN — TIOTROPIUM BROMIDE INHALATION SPRAY 2 PUFF: 3.12 SPRAY, METERED RESPIRATORY (INHALATION) at 09:05

## 2022-05-20 RX ADMIN — LACTULOSE 30 G: 20 SOLUTION ORAL at 03:05

## 2022-05-20 RX ADMIN — FLUTICASONE FUROATE AND VILANTEROL TRIFENATATE 1 PUFF: 200; 25 POWDER RESPIRATORY (INHALATION) at 09:05

## 2022-05-20 RX ADMIN — DOBUTAMINE IN DEXTROSE 10 MCG/KG/MIN: 100 INJECTION, SOLUTION INTRAVENOUS at 10:05

## 2022-05-20 RX ADMIN — LACTULOSE 30 G: 20 SOLUTION ORAL at 08:05

## 2022-05-20 RX ADMIN — CYCLOBENZAPRINE HYDROCHLORIDE 10 MG: 5 TABLET, FILM COATED ORAL at 03:05

## 2022-05-20 NOTE — CONSULTS
[facility] - Observation  Psychiatry  Consult Note    Patient Name: Nic Munoz  MRN: [id number]   Code Status: Full Code  Admission Date: 5/17/2022  Hospital Length of Stay: 1 days  Attending Physician: Zayda Guerrero MD  Primary Care Provider: Primary Doctor No    Current Legal Status: None    Patient information was obtained from patient.   Inpatient consult to Psychiatry  Consult performed by: Trent Matthew Wiedemann, MD  Consult ordered by: Zayda Guerrero MD        Subjective:     Principal Problem:Alcoholic cirrhosis of liver with ascites    Chief Complaint:  Acute liver failure     HPI:   5/20/2022 10:11 AM  Nic Munoz  1965  [id number]      ADDICTION CONSULT INITIAL EVALUATION     DEPARTMENT:  Psychiatry  SITE: [facility], [address]    DATE OF ADMISSION: 5/17/2022  2:44 PM  LENGTH OF STAY: 1 days    EXAMINING PRACTITIONER: Trent Matthew Wiedemann    CONSULT REQUESTED BY: Zayda Guerrero MD      SUBJECTIVE     CHIEF COMPLAINT  Nic Munoz is a 56 y.o. male who is seen today for an initial psychiatric evaluation by the addiction psychiatry consult service.  Nic Munoz presents with the chief complaint of: pre-transplant evaluation      HISTORY OF PRESENT ILLNESS    Per Primary MD:  55 y/o M hx of alcoholic cirrhosis, HTN, hepatitis B on antiviral therapy [place] [facility] [place] for possible transplant. Pt comes from [place], [place]. Pt reports long history of cirrhosis; he states he has been receiving regular large-volume paracenteses for the past 10 months and he says they have been steadily increasing in frequency. He was referred last week to [facility] team for a TIPS EVAL. He was evaluated in clinic day of admission with Dr. Yan and was deemed not a candidate for TIPS currently given MELD of 20. He underwent a paracentesis and then was directed [place] the [facility] [place] to the hospital for transplant workup. On interview pt  reports feeling significantly better post-para. He denies any current acute complaints. He denies fever, chills, nausea, vomiting. Abdominal pain improved post-para. He does report significant LE swelling.      In the ED, Pt HDS, afebrile, /74, HR 70s. CBC without leukocytosis, mild anemia with hgb 13.2, Plt 132. CMP notable for Na 131, Cr elevated to 1.7 (from baseline 1.0). Ammonia 70. INR 1.3. paracentesis labs pending at time of admission.     Per Addiction Psych MD:  Chart reviewed, patient seen. He is lying in bed in no acute distress, calm, cooperative, linear in thought process. States he was diagnosed with alcoholic liver cirrhosis 3-4 years ago. Prior to that he was drinking up to 6 beers daily, had been doing so for years. Denies hx of complicated withdrawal in the past. Reports that since his diagnosis of cirrhosis, he has not used alcohol in over 3 and a half years. Reports previously completing a rehab program and remote involvement in AA groups though none recently. He does report use of other illicit substances since that time. Reports occasional cocaine use, as well as 2-3x weekly methamphetamine use (IV). Denies use of other substances. He last used any illicit substances over one month ago due to not feeling well, has not been feeling well and was having worsening abdominal pain and swelling which ultimately prompted him to go to the hospital. Discussed recommendations (see Assessment and Plan), patient appears willing and motivated to complete in rehab and re-establish engagement with 12-step program, as well as reports willingness to cease any further alcohol or drug use. Does report remote psychiatric hx of depression for which he was treated with Prozac and Seroquel for depression-related insomnia, though states has not needed in many years. Denies depressed mood currently, though does voice reasonable frustration with being in hospital and being away from home. Denies any previous  psychiatric hospitalizations, suicide attempts, current SI/HI/AVH, or access to firearms.     COLLATERAL  N/A    SUBSTANCE ABUSE HISTORY  Substance(s) of Choice: methamphetamine, cocaine  Substances Used: Alcohol (in remission), methamphetamine, cocaine  History of IVDU?: Yes  Use of Alcohol: Former heavy user, denies recent use  Average Consumption: Former daily drinker  Last Drink: >3 years  Use of Medications for Alcohol/Opioid Use Disorder: no  History of Complicated Withdrawal: denies  History of Detox: denies  Rehab History: yes  AA/NA involvement: yes, former AA involvement  Tobacco: Not assessed  Spouse/Partner Consumption: N/A  Patient Aware of Biomedical Complications: yes    DSM-5 SUBSTANCE USE DISORDER CRITERIA   Mild (1-3), Moderate (4-5), Severe (?6)  1. Often take in larger amounts or over a longer period of time than was intended.  2. Persistent desire or unsuccessful efforts to cut down or control use.  3. Great deal of time spent in activities necessary to obtain substance, use, or recover from effects.  4. Craving/strong desire for substance or urge to use.  5. Use resulting in failure to fulfill major role obligations at home, work or school.  6. Social, occupational, recreational activities decreased because of use.  7. Continued use despite having persistent or recurrent social or interpersonal problems cause or exaserbated by the substance.  8. Recurrent use in situations in which it is physically hazardous.  9. Use despite physical or psychological problems that are likely to have been caused or exacerbated by the substance.  10. Tolerance, as defined by either of the following.   A. A need for markedly increased amounts of substance to achieve intoxication or desired effect. -OR-    B. A markedly diminished effect with continued use of the same amount of substance.  11. Withdrawal, as manifested by the following.   A. The characteristic withdrawal syndrome for substance. -AND-   B. Substance  is taken to relieve or avoid withdrawal symptoms.    ARE THE CRITERIA MET FOR DSM-5 SUBSTANCE USE DISORDER: Yes      TRANSPLANT EVALUATION  Date first informed of impending organ failure - 2019 diagnosed with alcoholic liver cirrhosis  When was the subject of transplantation first broached - This admission, May 2022  Pt made the following lifestyle adjustments and how - Patient reports complete cessation of alcohol since diagnosis of cirrhosis  Does the patient accept/understand the connection between substance use and subsequent organ failure - Yes  Date of last use of substances - Alcohol last used 3 years ago, methamphetamine 3 weeks ago  Understands need for lifetime medication - Yes  Understands need for lifetime sobriety - Yes  View of AA/NA - Patient is willing to attend, reports used to attend groups  Social support - Yes, reports supportive family in [place]       PSYCHIATRIC HISTORY  Reported Diagnose(s): Depression  Previous Medication Trials: Prozac, Seroquel  Previous Psychiatric Hospitalizations: denies  Outpatient Psychiatrist?: denies      SUICIDE/HOMICIDE RISK ASSESSMENT  Current/active suicidal ideation/plan/intent: denies  Previous suicide attempts: denies  Current/active homicidal ideation/plan/intent: denies      HISTORY OF TRAUMA, ABUSE & VIOLENCE  Trauma: Not assessed  Physical Abuse: Not assessed  Sexual Abuse: Not assessed  Violent Conduct: Not assessed    Access to Gun: Denies, gun safety discussed       FAMILY PSYCHIATRIC HISTORY   Bipolar Disorder-sister       SOCIAL HISTORY  Employment: [employer], has not been able to work for 6 months due to medical illness  Home: Lives alone in an apartment for past 2 years, prior to that was homeless  Kids: none  :   Family: reports supportive family in [place]       PAST MEDICAL HISTORY   Alcoholic liver cirrhosis, HBV on antiviral therapy, HTN      PSYCHOSOCIAL FACTORS  Stressors (Psychosocial and Environmental): health and drug and alcohol.        PSYCHIATRIC ROS  Denies depressed mood, anxiety out of proportion to stressor, SI/HI/AVH. No delusional thought content voiced.     MEDICAL ROS    Complete review of systems performed covering Constitutional, Eyes, ENT/Mouth, Cardiovascular, Respiratory, Gastrointestinal, Genitourinary, Musculoskeletal, Skin, Neurologic, Endocrine, Heme/Lymph, and Allergy/Immune.     Complete review of systems was negative with the exception of the following positive symptoms: Ascites, abdominal pain      ALLERGIES  Patient has no known allergies.      MEDICATIONS    Psychotropics:  None    Infusions:      Scheduled:   enoxaparin  40 mg Subcutaneous Daily    fluticasone furoate-vilanteroL  1 puff Inhalation Daily    furosemide  40 mg Oral Daily    lactulose  30 g Oral TID    pantoprazole  40 mg Oral Daily    rifAXImin  550 mg Oral BID    spironolactone  50 mg Oral Daily    tenofovir disoproxil fumarate  300 mg Oral Daily    tiotropium bromide  2 puff Inhalation Daily       PRN:  albuterol, cyclobenzaprine, dextrose 10%, dextrose 10%, glucagon (human recombinant), glucose, glucose, naloxone, simethicone, sodium chloride 0.9%    Home Medications:  Prior to Admission medications    Medication Sig Start Date End Date Taking? Authorizing Provider   fluticasone furoate-vilanteroL (BREO) 200-25 mcg/dose DsDv diskus inhaler Inhale 1 puff into the lungs once daily. 5/12/22  Yes Historical Provider   tenofovir disoproxil fumarate (VIREAD) 300 mg Tab Take 300 mg by mouth. 1/28/22 1/28/23 Yes Historical Provider   furosemide (LASIX) 40 MG tablet Take 40 mg by mouth 2 (two) times daily. 4/21/22   Historical Provider   lactulose (CHRONULAC) 10 gram/15 mL solution Take 30 mLs by mouth 2 (two) times daily. 5/12/22   Historical Provider   oxybutynin (DITROPAN) 5 MG Tab Take 5 mg by mouth 3 (three) times daily. 3/16/22   Historical Provider   pantoprazole (PROTONIX) 40 MG tablet Take 40 mg by mouth once daily. 5/12/22   Historical  "Provider   SPIRIVA RESPIMAT 2.5 mcg/actuation inhaler Inhale 2 puffs into the lungs once daily. 5/12/22   Historical Provider   spironolactone (ALDACTONE) 100 MG tablet Take 100 mg by mouth once daily. 4/21/22   Historical Provider   SYMBICORT 160-4.5 mcg/actuation HFAA Inhale 2 puffs into the lungs 2 (two) times daily. 5/12/22   Historical Provider         OBJECTIVE:     EXAMINATION    Vitals:    05/20/22 0359 05/20/22 0828 05/20/22 0829 05/20/22 0832   BP: 122/74   109/62   BP Location: Right arm      Patient Position: Lying   Lying   Pulse: 98 74 74 97   Resp: 20 18 18 18   Temp: 98.4 °F (36.9 °C)   98 °F (36.7 °C)   TempSrc: Oral   Oral   SpO2: (!) 92%   (!) 91%   Weight:       Height:           PAIN   6/10    CONSTITUTIONAL  General Appearance and Manner: Lying in bed in no acute distress, calm, cooperative    MUSCULOSKELETAL  Abnormal Involuntary Movements: No  Muscle Strength and Tone:  Normal  Gait and Station: Not assessed      PSYCHIATRIC   Orientation: Full, intact  Speech: Normal rate, volume, tone, no dysarthria  Language: English, fluid  Mood: Euthymic, "okay"  Affect: Full, Reactive, Appropriate  Thought Process: Linear, organized  Associations: Intact  Thought Content: No SI/HI/AVH  Memory: Intact  Attention and Concentration: Intact  Fund of Knowledge: Intact, Appropriate  Insight: Fair  Judgment: Fair, appropriate for setting      Labs/Imaging/Studies:   Recent Results (from the past 24 hour(s))   (rule out SBP) Aerobic culture    Collection Time: 05/19/22 12:01 PM    Specimen: Ascites   Result Value Ref Range    Aerobic Bacterial Culture No growth    (rule out SBP) Culture, Anaerobic    Collection Time: 05/19/22 12:01 PM    Specimen: Ascites   Result Value Ref Range    Anaerobic Culture Culture in progress    (rule out SBP) Gram stain    Collection Time: 05/19/22 12:01 PM    Specimen: Ascites   Result Value Ref Range    Gram Stain Result Rare WBC's     Gram Stain Result No organisms seen  "   Albumin, Peritoneal, Pleural Fluid or JERRY Drainage, In-House Ascites    Collection Time: 05/19/22 12:01 PM   Result Value Ref Range    Body Fluid Source, Albumin Ascites     Body Fluid, Albumin 0.5 See text g/dL   Protein, Peritoneal, Pleural Fluid or JERRY Drainage, In-House Ascites    Collection Time: 05/19/22 12:01 PM   Result Value Ref Range    Body Fluid Source, Total Protein Ascites     Body Fluid, Protein 1.2 Not established g/dL   LDH, Peritoneal, Pleural Fluid or JERRY Drainage, In-House Ascites    Collection Time: 05/19/22 12:01 PM   Result Value Ref Range    Body Fluid Source, LDH Ascites     LD, Fluid 81 Not established U/L   WBC & Diff,Body Fluid Ascites    Collection Time: 05/19/22 12:01 PM   Result Value Ref Range    Body Fluid Type Ascites     Fluid Appearance Turbid     Fluid Color Other     WBC, Body Fluid 250 /cu mm    Segs, Fluid 14 %    Lymphs, Fluid 78 %    Monocytes/Macrophages, Fluid 5 %    Eos, Fluid 1 %    Mesothelial Cells, Fluid 2 %   Comprehensive Metabolic Panel (CMP)    Collection Time: 05/20/22  2:30 AM   Result Value Ref Range    Sodium 134 (L) 136 - 145 mmol/L    Potassium 4.1 3.5 - 5.1 mmol/L    Chloride 102 95 - 110 mmol/L    CO2 21 (L) 23 - 29 mmol/L    Glucose 95 70 - 110 mg/dL    BUN 25 (H) 6 - 20 mg/dL    Creatinine 1.0 0.5 - 1.4 mg/dL    Calcium 8.5 (L) 8.7 - 10.5 mg/dL    Total Protein 5.9 (L) 6.0 - 8.4 g/dL    Albumin 3.2 (L) 3.5 - 5.2 g/dL    Total Bilirubin 2.2 (H) 0.1 - 1.0 mg/dL    Alkaline Phosphatase 122 55 - 135 U/L    AST 37 10 - 40 U/L    ALT 23 10 - 44 U/L    Anion Gap 11 8 - 16 mmol/L    eGFR if African American >60.0 >60 mL/min/1.73 m^2    eGFR if non African American >60.0 >60 mL/min/1.73 m^2   Magnesium    Collection Time: 05/20/22  2:30 AM   Result Value Ref Range    Magnesium 1.7 1.6 - 2.6 mg/dL   CBC with Automated Differential    Collection Time: 05/20/22  2:30 AM   Result Value Ref Range    WBC 8.62 3.90 - 12.70 K/uL    RBC 4.45 (L) 4.60 - 6.20 M/uL     Hemoglobin 13.3 (L) 14.0 - 18.0 g/dL    Hematocrit 39.0 (L) 40.0 - 54.0 %    MCV 88 82 - 98 fL    MCH 29.9 27.0 - 31.0 pg    MCHC 34.1 32.0 - 36.0 g/dL    RDW 14.3 11.5 - 14.5 %    Platelets 86 (L) 150 - 450 K/uL    MPV 10.3 9.2 - 12.9 fL    Immature Granulocytes 0.3 0.0 - 0.5 %    Gran # (ANC) 6.5 1.8 - 7.7 K/uL    Immature Grans (Abs) 0.03 0.00 - 0.04 K/uL    Lymph # 0.9 (L) 1.0 - 4.8 K/uL    Mono # 1.1 (H) 0.3 - 1.0 K/uL    Eos # 0.1 0.0 - 0.5 K/uL    Baso # 0.03 0.00 - 0.20 K/uL    nRBC 0 0 /100 WBC    Gran % 75.0 (H) 38.0 - 73.0 %    Lymph % 10.6 (L) 18.0 - 48.0 %    Mono % 12.4 4.0 - 15.0 %    Eosinophil % 1.4 0.0 - 8.0 %    Basophil % 0.3 0.0 - 1.9 %    Differential Method Automated    Protime-INR    Collection Time: 05/20/22  2:30 AM   Result Value Ref Range    Prothrombin Time 12.9 (H) 9.0 - 12.5 sec    INR 1.3 (H) 0.8 - 1.2      Imaging Results              X-Ray Chest AP Portable (Final result)  Result time 05/17/22 21:58:36      Final result by Kurtis Barnett DO (05/17/22 21:58:36)                   Impression:      Bibasilar opacities compatible with atelectasis or consolidations.    Small right pleural effusion.      Electronically signed by: Kurtis Barnett  Date:    05/17/2022  Time:    21:58               Narrative:    EXAMINATION:  XR CHEST AP PORTABLE    CLINICAL HISTORY:  COPD;    TECHNIQUE:  Single frontal view of the chest was performed.    COMPARISON:  None    FINDINGS:  The lungs are hypoexpanded.  There are bibasilar opacities compatible with atelectasis or consolidations.  There is a small right pleural effusion.    The cardiac silhouette is unremarkable.    The visualized osseous structures are intact.                                       CT Renal Stone Study Abd Pelvis WO (Final result)  Result time 05/17/22 21:52:15      Final result by Gary Samano MD (05/17/22 21:52:15)                   Impression:      1. Cirrhosis of the liver with diffuse ascites.  2. Splenomegaly suggesting  portal venous hypertension.  3. Nonobstructing nephrolithiasis of the left kidney.  4. Right basilar pleural effusion with associated mild atelectasis or consolidation.  Mild left basilar atelectasis also.  5. Small umbilical hernia.  6. Mild probable chronic compression fracture of T12.  7. Remote fractures of ribs 8 on the left, 4 and 7 on the right.  8. Possible constipation.      Electronically signed by: Gary Camarena  Date:    05/17/2022  Time:    21:52               Narrative:    EXAMINATION:  CT RENAL STONE STUDY ABD PELVIS WO    CLINICAL HISTORY:  Nephrolithiasis, symptomatic/complicated;BC, R ureteral stent, partial L staghorn on outside imaging;    TECHNIQUE:  Low dose axial images, sagittal and coronal reformations were obtained from the lung bases to the pubic symphysis.  Contrast was not administered.    COMPARISON:  None    FINDINGS:  Heart: Normal in size. No pericardial effusion.    Lung Bases: Mild right basilar pleural effusion with associated mild atelectasis or mild consolidation.    Mild left basilar atelectasis also.    Liver: Prominent cirrhotic changes of the liver with nodularity.  No focal mass on limited noncontrast study.    Gallbladder: No calcified gallstones.    Bile Ducts: No evidence of dilated ducts.    Pancreas: No mass or peripancreatic fat stranding.    Spleen: Spleen is enlarged.  No focal abnormality.    Adrenals: Unremarkable.    Kidneys/ Ureters: Large stone or calculus in the lower pole the left kidney with mild fragmentation.  No stones on the right.  No hydronephrosis or hydroureter bilaterally.    Bladder: No evidence of wall thickening.    Reproductive organs: Unremarkable.    GI Tract/Mesentery: No evidence of bowel obstruction or inflammation.  Retained feces in the colon.    The appendix is within normal limits.    Peritoneal Space: Prominent diffuse ascites.  No free air.    Retroperitoneum: No significant adenopathy.    Abdominal wall: Small umbilical  hernia    Vasculature: No significant atherosclerosis or aneurysm.    Bones: Mild probable chronic compression fracture of T12 with anterior wedging.  No comminution or retropulsion.    Remote fracture of rib 8 anterior laterally on the left and ribs 4 and 7 laterally on the right.                                          Hospital Course: No notes on file    No new subjective & objective note has been filed under this hospital service since the last note was generated.    Assessment/Plan:     * Alcoholic cirrhosis of liver with ascites    ASSESSMENT:     DIAGNOSES & PROBLEMS  Alcohol use disorder, severe, in sustained remission  Stimulant use disorder, moderate       STRENGTHS AND LIABILITIES   Strength: Patient accepts guidance/feedback, Strength: Patient is motivated for change., Strength: Patient has positive support network., Liability: Patient has poor health.      MOTIVATION TO PURSUE RECOVERY: High    ACCEPTANCE OF ADDICTION: good    ABILITY TO ADHERE TO TREATMENT PLAN: moderate      PLAN:       TRANSPLANT SUITABILITY  From a psychiatric perspective, this patient presents as a High risk for transplant, particularly due to his recent methamphetamine use. Of note, he does report lifestyle change in regard to alcohol use, reports has not used alcohol in over 3 years since diagnosed with liver cirrhosis. Discussed with the patient his high risk suitability and recommendations below to improve his suitability. He did appear motivated to pursue recovery and cessation of substance use at the time of my interview.       MANAGEMENT PLAN, TREATMENT GOALS, THERAPEUTIC TECHNIQUES/APPROACHES & CLINICAL REASONING  -Recommend patient complete a licensed substance rehabilitation program  -Recommend patient maintain lifelong engagement with 12 step program such as as AA or NA, or [facility]  -Maintain lifelong cessation of alcohol and illicit substances      · Patient counseled on abstinence from alcohol and substances of  abuse (illicit and prescription).  · Additional workup planned to address substance use disorder, in order to guide and refine ongoing management options, includes serial alcohol and drug laboratory testing (e.g. PETH, urine toxicology).  · Relapse prevention and motivational interviewing provided.  · Education provided on 12 step recovery programs.      PRESCRIPTION DRUG MANAGEMENT  - The risks and benefits of medication were discussed with this patient.  - Possible expectable adverse effects of any current or proposed individual psychotropic agents were discussed with this patient.  - Counseling was provided on the importance of full compliance with medication regimens.      In cases of emergency, daily coverage provided by Acute/ER Psych MD, NP, or SW, with contact numbers located in [facility] On Call Schedule    Case discussed with staff addiction psychiatrist: NARA RUIZ MD       Thank you for the consult, Addiction Psychiatry will sign off now    Total Time:  60 minutes      Trent Matthew Wiedemann, MD   Psychiatry  [facility] - Observation

## 2022-05-20 NOTE — HPI
5/20/2022 10:11 AM  Nic Munoz  1965  [id number]      ADDICTION CONSULT INITIAL EVALUATION     DEPARTMENT:  Psychiatry  SITE: [facility], [address]    DATE OF ADMISSION: 5/17/2022  2:44 PM  LENGTH OF STAY: 1 days    EXAMINING PRACTITIONER: Trent Matthew Wiedemann    CONSULT REQUESTED BY: Zayda Guerrero MD      SUBJECTIVE     CHIEF COMPLAINT  Nic Munoz is a 56 y.o. male who is seen today for an initial psychiatric evaluation by the addiction psychiatry consult service.  Nic Munoz presents with the chief complaint of: pre-transplant evaluation      HISTORY OF PRESENT ILLNESS    Per Primary MD:  57 y/o M hx of alcoholic cirrhosis, HTN, hepatitis B on antiviral therapy presents as a transfer to [facility] for hepatology eval for possible transplant. Pt comes from [place], [place]. Pt reports long history of cirrhosis; he states he has been receiving regular large-volume paracenteses for the past 10 months and he says they have been steadily increasing in frequency. He was referred last week to [facility] team for a TIPS EVAL. He was evaluated in clinic day of admission with Dr. Yan and was deemed not a candidate for TIPS currently given MELD of 20. He underwent a paracentesis and then was directed to the [facility] ED for admission to the hospital for transplant workup. On interview pt reports feeling significantly better post-para. He denies any current acute complaints. He denies fever, chills, nausea, vomiting. Abdominal pain improved post-para. He does report significant LE swelling.      In the ED, Pt HDS, afebrile, /74, HR 70s. CBC without leukocytosis, mild anemia with hgb 13.2, Plt 132. CMP notable for Na 131, Cr elevated to 1.7 (from baseline 1.0). Ammonia 70. INR 1.3. paracentesis labs pending at time of admission.     Per Addiction Psych MD:  Chart reviewed, patient seen. He is lying in bed in no acute distress, calm, cooperative, linear in thought process. States  he was diagnosed with alcoholic liver cirrhosis 3-4 years ago. Prior to that he was drinking up to 6 beers daily, had been doing so for years. Denies hx of complicated withdrawal in the past. Reports that since his diagnosis of cirrhosis, he has not used alcohol in over 3 and a half years. Reports previously completing a rehab program and remote involvement in AA groups though none recently. He does report use of other illicit substances since that time. Reports occasional cocaine use, as well as 2-3x weekly methamphetamine use (IV). Denies use of other substances. He last used any illicit substances over one month ago due to not feeling well, has not been feeling well and was having worsening abdominal pain and swelling which ultimately prompted him to go to the hospital. Discussed recommendations (see Assessment and Plan), patient appears willing and motivated to complete in rehab and re-establish engagement with 12-step program, as well as reports willingness to cease any further alcohol or drug use. Does report remote psychiatric hx of depression for which he was treated with Prozac and Seroquel for depression-related insomnia, though states has not needed in many years. Denies depressed mood currently, though does voice reasonable frustration with being in hospital and being away from home. Denies any previous psychiatric hospitalizations, suicide attempts, current SI/HI/AVH, or access to firearms.     COLLATERAL  N/A    SUBSTANCE ABUSE HISTORY  Substance(s) of Choice: methamphetamine, cocaine  Substances Used: Alcohol (in remission), methamphetamine, cocaine  History of IVDU?: Yes  Use of Alcohol: Former heavy user, denies recent use  Average Consumption: Former daily drinker  Last Drink: >3 years  Use of Medications for Alcohol/Opioid Use Disorder: no  History of Complicated Withdrawal: denies  History of Detox: denies  Rehab History: yes  AA/NA involvement: yes, former AA involvement  Tobacco: Not  assessed  Spouse/Partner Consumption: N/A  Patient Aware of Biomedical Complications: yes    DSM-5 SUBSTANCE USE DISORDER CRITERIA   Mild (1-3), Moderate (4-5), Severe (?6)  Often take in larger amounts or over a longer period of time than was intended.  Persistent desire or unsuccessful efforts to cut down or control use.  Great deal of time spent in activities necessary to obtain substance, use, or recover from effects.  Craving/strong desire for substance or urge to use.  Use resulting in failure to fulfill major role obligations at home, work or school.  Social, occupational, recreational activities decreased because of use.  Continued use despite having persistent or recurrent social or interpersonal problems cause or exaserbated by the substance.  Recurrent use in situations in which it is physically hazardous.  Use despite physical or psychological problems that are likely to have been caused or exacerbated by the substance.  Tolerance, as defined by either of the following.   A. A need for markedly increased amounts of substance to achieve intoxication or desired effect. -OR-    B. A markedly diminished effect with continued use of the same amount of substance.  Withdrawal, as manifested by the following.   A. The characteristic withdrawal syndrome for substance. -AND-   B. Substance is taken to relieve or avoid withdrawal symptoms.    ARE THE CRITERIA MET FOR DSM-5 SUBSTANCE USE DISORDER: Yes      TRANSPLANT EVALUATION  Date first informed of impending organ failure - 2019 diagnosed with alcoholic liver cirrhosis  When was the subject of transplantation first broached - This admission, May 2022  Pt made the following lifestyle adjustments and how - Patient reports complete cessation of alcohol since diagnosis of cirrhosis  Does the patient accept/understand the connection between substance use and subsequent organ failure - Yes  Date of last use of substances - Alcohol last used 3 years ago, methamphetamine  3 weeks ago  Understands need for lifetime medication - Yes  Understands need for lifetime sobriety - Yes  View of AA/NA - Patient is willing to attend, reports used to attend groups  Social support - Yes, reports supportive family in [place]       PSYCHIATRIC HISTORY  Reported Diagnose(s): Depression  Previous Medication Trials: Prozac Seroquel  Previous Psychiatric Hospitalizations: denies  Outpatient Psychiatrist?: denies      SUICIDE/HOMICIDE RISK ASSESSMENT  Current/active suicidal ideation/plan/intent: denies  Previous suicide attempts: denies  Current/active homicidal ideation/plan/intent: denies      HISTORY OF TRAUMA, ABUSE & VIOLENCE  Trauma: Not assessed  Physical Abuse: Not assessed  Sexual Abuse: Not assessed  Violent Conduct: Not assessed    Access to Gun: Denies, gun safety discussed       FAMILY PSYCHIATRIC HISTORY   Bipolar Disorder-sister       SOCIAL HISTORY  Employment: [employer], has not been able to work for 6 months due to medical illness  Home: Lives alone in an apartment for past 2 years, prior to that was homeless  Kids: none  :   Family: reports supportive family in [place]       PAST MEDICAL HISTORY   Alcoholic liver cirrhosis, HBV on antiviral therapy, HTN      PSYCHOSOCIAL FACTORS  Stressors (Psychosocial and Environmental): health and drug and alcohol.       PSYCHIATRIC ROS  Denies depressed mood, anxiety out of proportion to stressor, SI/HI/AVH. No delusional thought content voiced.     MEDICAL ROS    Complete review of systems performed covering Constitutional, Eyes, ENT/Mouth, Cardiovascular, Respiratory, Gastrointestinal, Genitourinary, Musculoskeletal, Skin, Neurologic, Endocrine, Heme/Lymph, and Allergy/Immune.     Complete review of systems was negative with the exception of the following positive symptoms: Ascites, abdominal pain      ALLERGIES  Patient has no known allergies.      MEDICATIONS    Psychotropics:  None    Infusions:      Scheduled:   enoxaparin  40 mg  Subcutaneous Daily    fluticasone furoate-vilanteroL  1 puff Inhalation Daily    furosemide  40 mg Oral Daily    lactulose  30 g Oral TID    pantoprazole  40 mg Oral Daily    rifAXImin  550 mg Oral BID    spironolactone  50 mg Oral Daily    tenofovir disoproxil fumarate  300 mg Oral Daily    tiotropium bromide  2 puff Inhalation Daily       PRN:  albuterol, cyclobenzaprine, dextrose 10%, dextrose 10%, glucagon (human recombinant), glucose, glucose, naloxone, simethicone, sodium chloride 0.9%    Home Medications:  Prior to Admission medications    Medication Sig Start Date End Date Taking? Authorizing Provider   fluticasone furoate-vilanteroL (BREO) 200-25 mcg/dose DsDv diskus inhaler Inhale 1 puff into the lungs once daily. 5/12/22  Yes Historical Provider   tenofovir disoproxil fumarate (VIREAD) 300 mg Tab Take 300 mg by mouth. 1/28/22 1/28/23 Yes Historical Provider   furosemide (LASIX) 40 MG tablet Take 40 mg by mouth 2 (two) times daily. 4/21/22   Historical Provider   lactulose (CHRONULAC) 10 gram/15 mL solution Take 30 mLs by mouth 2 (two) times daily. 5/12/22   Historical Provider   oxybutynin (DITROPAN) 5 MG Tab Take 5 mg by mouth 3 (three) times daily. 3/16/22   Historical Provider   pantoprazole (PROTONIX) 40 MG tablet Take 40 mg by mouth once daily. 5/12/22   Historical Provider   SPIRIVA RESPIMAT 2.5 mcg/actuation inhaler Inhale 2 puffs into the lungs once daily. 5/12/22   Historical Provider   spironolactone (ALDACTONE) 100 MG tablet Take 100 mg by mouth once daily. 4/21/22   Historical Provider   SYMBICORT 160-4.5 mcg/actuation HFAA Inhale 2 puffs into the lungs 2 (two) times daily. 5/12/22   Historical Provider         OBJECTIVE:     EXAMINATION    Vitals:    05/20/22 0359 05/20/22 0828 05/20/22 0829 05/20/22 0832   BP: 122/74   109/62   BP Location: Right arm      Patient Position: Lying   Lying   Pulse: 98 74 74 97   Resp: 20 18 18 18   Temp: 98.4 °F (36.9 °C)   98 °F (36.7 °C)   TempSrc: Oral    "Oral   SpO2: (!) 92%   (!) 91%   Weight:       Height:           PAIN   6/10    CONSTITUTIONAL  General Appearance and Manner: Lying in bed in no acute distress, calm, cooperative    MUSCULOSKELETAL  Abnormal Involuntary Movements: No  Muscle Strength and Tone:  Normal  Gait and Station: Not assessed      PSYCHIATRIC   Orientation: Full, intact  Speech: Normal rate, volume, tone, no dysarthria  Language: English, fluid  Mood: Euthymic, "okay"  Affect: Full, Reactive, Appropriate  Thought Process: Linear, organized  Associations: Intact  Thought Content: No SI/HI/AVH  Memory: Intact  Attention and Concentration: Intact  Fund of Knowledge: Intact, Appropriate  Insight: Fair  Judgment: Fair, appropriate for setting      Labs/Imaging/Studies:   Recent Results (from the past 24 hour(s))   (rule out SBP) Aerobic culture    Collection Time: 05/19/22 12:01 PM    Specimen: Ascites   Result Value Ref Range    Aerobic Bacterial Culture No growth    (rule out SBP) Culture, Anaerobic    Collection Time: 05/19/22 12:01 PM    Specimen: Ascites   Result Value Ref Range    Anaerobic Culture Culture in progress    (rule out SBP) Gram stain    Collection Time: 05/19/22 12:01 PM    Specimen: Ascites   Result Value Ref Range    Gram Stain Result Rare WBC's     Gram Stain Result No organisms seen    Albumin, Peritoneal, Pleural Fluid or JERRY Drainage, In-House Ascites    Collection Time: 05/19/22 12:01 PM   Result Value Ref Range    Body Fluid Source, Albumin Ascites     Body Fluid, Albumin 0.5 See text g/dL   Protein, Peritoneal, Pleural Fluid or JERRY Drainage, In-House Ascites    Collection Time: 05/19/22 12:01 PM   Result Value Ref Range    Body Fluid Source, Total Protein Ascites     Body Fluid, Protein 1.2 Not established g/dL   LDH, Peritoneal, Pleural Fluid or JERRY Drainage, In-House Ascites    Collection Time: 05/19/22 12:01 PM   Result Value Ref Range    Body Fluid Source, LDH Ascites     LD, Fluid 81 Not established U/L   WBC & " Diff,Body Fluid Ascites    Collection Time: 05/19/22 12:01 PM   Result Value Ref Range    Body Fluid Type Ascites     Fluid Appearance Turbid     Fluid Color Other     WBC, Body Fluid 250 /cu mm    Segs, Fluid 14 %    Lymphs, Fluid 78 %    Monocytes/Macrophages, Fluid 5 %    Eos, Fluid 1 %    Mesothelial Cells, Fluid 2 %   Comprehensive Metabolic Panel (CMP)    Collection Time: 05/20/22  2:30 AM   Result Value Ref Range    Sodium 134 (L) 136 - 145 mmol/L    Potassium 4.1 3.5 - 5.1 mmol/L    Chloride 102 95 - 110 mmol/L    CO2 21 (L) 23 - 29 mmol/L    Glucose 95 70 - 110 mg/dL    BUN 25 (H) 6 - 20 mg/dL    Creatinine 1.0 0.5 - 1.4 mg/dL    Calcium 8.5 (L) 8.7 - 10.5 mg/dL    Total Protein 5.9 (L) 6.0 - 8.4 g/dL    Albumin 3.2 (L) 3.5 - 5.2 g/dL    Total Bilirubin 2.2 (H) 0.1 - 1.0 mg/dL    Alkaline Phosphatase 122 55 - 135 U/L    AST 37 10 - 40 U/L    ALT 23 10 - 44 U/L    Anion Gap 11 8 - 16 mmol/L    eGFR if African American >60.0 >60 mL/min/1.73 m^2    eGFR if non African American >60.0 >60 mL/min/1.73 m^2   Magnesium    Collection Time: 05/20/22  2:30 AM   Result Value Ref Range    Magnesium 1.7 1.6 - 2.6 mg/dL   CBC with Automated Differential    Collection Time: 05/20/22  2:30 AM   Result Value Ref Range    WBC 8.62 3.90 - 12.70 K/uL    RBC 4.45 (L) 4.60 - 6.20 M/uL    Hemoglobin 13.3 (L) 14.0 - 18.0 g/dL    Hematocrit 39.0 (L) 40.0 - 54.0 %    MCV 88 82 - 98 fL    MCH 29.9 27.0 - 31.0 pg    MCHC 34.1 32.0 - 36.0 g/dL    RDW 14.3 11.5 - 14.5 %    Platelets 86 (L) 150 - 450 K/uL    MPV 10.3 9.2 - 12.9 fL    Immature Granulocytes 0.3 0.0 - 0.5 %    Gran # (ANC) 6.5 1.8 - 7.7 K/uL    Immature Grans (Abs) 0.03 0.00 - 0.04 K/uL    Lymph # 0.9 (L) 1.0 - 4.8 K/uL    Mono # 1.1 (H) 0.3 - 1.0 K/uL    Eos # 0.1 0.0 - 0.5 K/uL    Baso # 0.03 0.00 - 0.20 K/uL    nRBC 0 0 /100 WBC    Gran % 75.0 (H) 38.0 - 73.0 %    Lymph % 10.6 (L) 18.0 - 48.0 %    Mono % 12.4 4.0 - 15.0 %    Eosinophil % 1.4 0.0 - 8.0 %    Basophil % 0.3  0.0 - 1.9 %    Differential Method Automated    Protime-INR    Collection Time: 05/20/22  2:30 AM   Result Value Ref Range    Prothrombin Time 12.9 (H) 9.0 - 12.5 sec    INR 1.3 (H) 0.8 - 1.2      Imaging Results              X-Ray Chest AP Portable (Final result)  Result time 05/17/22 21:58:36      Final result by Kurtis Barnett DO (05/17/22 21:58:36)                   Impression:      Bibasilar opacities compatible with atelectasis or consolidations.    Small right pleural effusion.      Electronically signed by: Kurtis Barnett  Date:    05/17/2022  Time:    21:58               Narrative:    EXAMINATION:  XR CHEST AP PORTABLE    CLINICAL HISTORY:  COPD;    TECHNIQUE:  Single frontal view of the chest was performed.    COMPARISON:  None    FINDINGS:  The lungs are hypoexpanded.  There are bibasilar opacities compatible with atelectasis or consolidations.  There is a small right pleural effusion.    The cardiac silhouette is unremarkable.    The visualized osseous structures are intact.                                       CT Renal Stone Study Abd Pelvis WO (Final result)  Result time 05/17/22 21:52:15      Final result by Gary Camarena MD (05/17/22 21:52:15)                   Impression:      1. Cirrhosis of the liver with diffuse ascites.  2. Splenomegaly suggesting portal venous hypertension.  3. Nonobstructing nephrolithiasis of the left kidney.  4. Right basilar pleural effusion with associated mild atelectasis or consolidation.  Mild left basilar atelectasis also.  5. Small umbilical hernia.  6. Mild probable chronic compression fracture of T12.  7. Remote fractures of ribs 8 on the left, 4 and 7 on the right.  8. Possible constipation.      Electronically signed by: Gary Camarena  Date:    05/17/2022  Time:    21:52               Narrative:    EXAMINATION:  CT RENAL STONE STUDY ABD PELVIS WO    CLINICAL HISTORY:  Nephrolithiasis, symptomatic/complicated;BC, R ureteral stent, partial L staghorn on  outside imaging;    TECHNIQUE:  Low dose axial images, sagittal and coronal reformations were obtained from the lung bases to the pubic symphysis.  Contrast was not administered.    COMPARISON:  None    FINDINGS:  Heart: Normal in size. No pericardial effusion.    Lung Bases: Mild right basilar pleural effusion with associated mild atelectasis or mild consolidation.    Mild left basilar atelectasis also.    Liver: Prominent cirrhotic changes of the liver with nodularity.  No focal mass on limited noncontrast study.    Gallbladder: No calcified gallstones.    Bile Ducts: No evidence of dilated ducts.    Pancreas: No mass or peripancreatic fat stranding.    Spleen: Spleen is enlarged.  No focal abnormality.    Adrenals: Unremarkable.    Kidneys/ Ureters: Large stone or calculus in the lower pole the left kidney with mild fragmentation.  No stones on the right.  No hydronephrosis or hydroureter bilaterally.    Bladder: No evidence of wall thickening.    Reproductive organs: Unremarkable.    GI Tract/Mesentery: No evidence of bowel obstruction or inflammation.  Retained feces in the colon.    The appendix is within normal limits.    Peritoneal Space: Prominent diffuse ascites.  No free air.    Retroperitoneum: No significant adenopathy.    Abdominal wall: Small umbilical hernia    Vasculature: No significant atherosclerosis or aneurysm.    Bones: Mild probable chronic compression fracture of T12 with anterior wedging.  No comminution or retropulsion.    Remote fracture of rib 8 anterior laterally on the left and ribs 4 and 7 laterally on the right.

## 2022-05-20 NOTE — ASSESSMENT & PLAN NOTE
ASSESSMENT:     DIAGNOSES & PROBLEMS  Alcohol use disorder, severe, in sustained remission  Stimulant use disorder, moderate       STRENGTHS AND LIABILITIES   Strength: Patient accepts guidance/feedback, Strength: Patient is motivated for change., Strength: Patient has positive support network., Liability: Patient has poor health.      MOTIVATION TO PURSUE RECOVERY: High    ACCEPTANCE OF ADDICTION: good    ABILITY TO ADHERE TO TREATMENT PLAN: moderate      PLAN:       TRANSPLANT SUITABILITY  From a psychiatric perspective, this patient presents as a High risk for transplant, particularly due to his recent methamphetamine use. Of note, he does report lifestyle change in regard to alcohol use, reports has not used alcohol in over 3 years since diagnosed with liver cirrhosis. Discussed with the patient his high risk suitability and recommendations below to improve his suitability. He did appear motivated to pursue recovery and cessation of substance use at the time of my interview.       MANAGEMENT PLAN, TREATMENT GOALS, THERAPEUTIC TECHNIQUES/APPROACHES & CLINICAL REASONING  -Recommend patient complete a licensed substance rehabilitation program  -Recommend patient maintain lifelong engagement with 12 step program such as as AA or NA, or [facility]  -Maintain lifelong cessation of alcohol and illicit substances      · Patient counseled on abstinence from alcohol and substances of abuse (illicit and prescription).  · Additional workup planned to address substance use disorder, in order to guide and refine ongoing management options, includes serial alcohol and drug laboratory testing (e.g. PETH, urine toxicology).  · Relapse prevention and motivational interviewing provided.  · Education provided on 12 step recovery programs.      PRESCRIPTION DRUG MANAGEMENT  - The risks and benefits of medication were discussed with this patient.  - Possible expectable adverse effects of any current or proposed individual  psychotropic agents were discussed with this patient.  - Counseling was provided on the importance of full compliance with medication regimens.      In cases of emergency, daily coverage provided by Acute/ER Psych MD, NP, or SW, with contact numbers located in [facility] On Call Schedule    Case discussed with staff addiction psychiatrist: NARA RUIZ MD

## 2022-05-20 NOTE — PROGRESS NOTES
Progress Note  Hospital Medicine      Patient Name: Nic Munoz  MRN: [id number]  Patient Class: IP         Admission Date: 5/17/2022  Attending Physician: Zayda Guerrero   Primary Care Provider: Primary Doctor No    SUBJECTIVE:     Follow-up For:  Alcoholic cirrhosis of liver with ascites     Interval history/ROS:   5/20: DSE today.     5/19: at paracentesis today during  Rounds, DSE for AM.     HPI:  55 y/o M hx of alcoholic cirrhosis, HTN, hepatitis B on antiviral therapy presents as a transfer to [facility] for hepatology eval for possible transplant. Pt comes from [place], [place]. Pt reports long history of cirrhosis; he states he has been receiving regular large-volume paracenteses for the past 10 months and he says they have been steadily increasing in frequency. He was referred last week to [facility] team for a TIPS EVAL. He was evaluated in clinic day of admission with Dr. Yan and was deemed not a candidate for TIPS currently given MELD of 20. He underwent a paracentesis and then was directed to the [facility] ED for admission to the hospital for transplant workup. On interview pt reports feeling significantly better post-para. He denies any current acute complaints. He denies fever, chills, nausea, vomiting. Abdominal pain improved post-para. He does report significant LE swelling.      In the ED, Pt HDS, afebrile, /74, HR 70s. CBC without leukocytosis, mild anemia with hgb 13.2, Plt 132. CMP notable for Na 131, Cr elevated to 1.7 (from baseline 1.0). Ammonia 70. INR 1.3. paracentesis labs pending at time of admission.         Overview/Hospital Course:  Patient admitted for management of BC, decompensated cirrhosis, and hepatology eval. Pt given lactulose and ctx for SBP prophylaxis. Midodrine, octreotide, albumin given for likely HRS. Patient continued on home Tenofovir for HBV. Hepatology consulted on admission. The following day patient's Cr improving. CTAP demonstrating nonobstructive renal  calculi, no hydronephrosis. Paracentesis labs not c/w SBP, so Ctx discontinued. MELD improving. Will f/u hepatology recs. Pt taken off HRS protocol on 5/18 per hepatology. Hepatology will start tx workup. Anticipate transfer to IML team when space available.        OBJECTIVE:     Body mass index is 27.12 kg/m².    Vital Signs Range (Last 24H):  Temp:  [98 °F (36.7 °C)-99.3 °F (37.4 °C)]   Pulse:  []   Resp:  [16-20]   BP: (103-134)/(59-79)   SpO2:  [91 %-95 %]     I & O (Last 24H):    Intake/Output Summary (Last 24 hours) at 5/20/2022 1627  Last data filed at 5/20/2022 0100  Gross per 24 hour   Intake 120 ml   Output --   Net 120 ml        Physical Exam:  Vitals and nursing note reviewed.   Constitutional:       General: He is not in acute distress.     Appearance: He is ill-appearing. He is not toxic-appearing or diaphoretic.      Comments: Pleasant, ill-appearing male lying in bed in NAD.   HENT:      Head: Normocephalic and atraumatic.      Nose: Nose normal. No congestion.      Mouth/Throat:      Mouth: Mucous membranes are dry.      Pharynx: Oropharynx is clear.      Comments: Poor dentition  Eyes:      General: Scleral icterus present.      Extraocular Movements: Extraocular movements intact.      Pupils: Pupils are equal, round, and reactive to light.   Cardiovascular:      Rate and Rhythm: Normal rate and regular rhythm.      Pulses: Normal pulses.      Heart sounds: Normal heart sounds. No murmur heard.    No friction rub. No gallop.   Pulmonary:      Effort: Pulmonary effort is normal.      Breath sounds: Normal breath sounds.   Abdominal:      Comments: Abdomen distended. Nontender to palpation. Normoactive bowel sounds.    Musculoskeletal:      Cervical back: Normal range of motion and neck supple.      Right lower leg: Edema (3+ pitting) present.      Left lower leg: Edema (3+ pitting) present.   Skin:     General: Skin is warm and dry.      Coloration: Skin is jaundiced.   Neurological:       General: No focal deficit present.      Mental Status: He is alert and oriented to person, place, and time.      Comments: No asterixis or tremors noted   Psychiatric:         Mood and Affect: Mood normal.         Behavior: Behavior normal.        Recent Labs   Lab 05/18/22 0415 05/19/22  0257 05/20/22  0230   * 135* 134*   K 3.9 3.7 4.1    105 102   CO2 24 22* 21*   BUN 31* 26* 25*   CREATININE 1.3 1.1 1.0   GLU 95 138* 95   CALCIUM 8.3* 8.7 8.5*   MG 1.9 1.9 1.7     Recent Labs   Lab 05/18/22 0415 05/19/22  0257 05/20/22  0230   ALKPHOS 115 125 122   ALT 25 27 23   AST 34 40 37   ALBUMIN 3.1* 2.8* 3.2*   PROT 6.0 6.2 5.9*   BILITOT 1.8* 2.5* 2.2*   INR 1.3* 1.2 1.3*       Recent Labs   Lab 05/17/22  1117 05/17/22  1519 05/18/22 0415 05/19/22  0257 05/19/22  1201 05/20/22  0230   WBC  --    < > 6.59 8.73  --  8.62   HGB  --    < > 12.0* 13.8*  --  13.3*   HCT  --    < > 36.6* 41.8  --  39.0*   PLT  --    < > 93* 100*  --  86*   GRAN  --    < > 67.3  4.4 75.4*  6.6  --  75.0*  6.5   SEGS 30  --   --   --  14  --    LYMPH  --    < > 14.0*  0.9* 10.0*  0.9*  --  10.6*  0.9*   LYMPHS 61  --   --   --  78  --    MONO  --    < > 15.3*  1.0 12.3  1.1*  --  12.4  1.1*    < > = values in this interval not displayed.       No results for input(s): POCTGLUCOSE in the last 168 hours.    No results for input(s): TROPONINI in the last 168 hours.    Diagnostic Results:  Labs: Reviewed    enoxaparin, 40 mg, Subcutaneous, Daily  fluticasone furoate-vilanteroL, 1 puff, Inhalation, Daily  furosemide, 40 mg, Oral, Daily  lactulose, 30 g, Oral, TID  pantoprazole, 40 mg, Oral, Daily  rifAXImin, 550 mg, Oral, BID  spironolactone, 50 mg, Oral, Daily  tenofovir disoproxil fumarate, 300 mg, Oral, Daily  tiotropium bromide, 2 puff, Inhalation, Daily        As Needed albuterol, cyclobenzaprine, dextrose 10%, dextrose 10%, glucagon (human recombinant), glucose, glucose, naloxone, simethicone, sodium chloride  0.9%    ASSESSMENT/PLAN:     Assessment: Nic Munoz is a 56 y.o. male here with:     Active Hospital Problems    Diagnosis  POA    *Alcoholic cirrhosis of liver with ascites [K70.31]  Yes    Amphetamine use disorder, severe [F15.20]  Yes     Chronic    Alcohol use disorder, severe, in sustained remission [F10.21]  Yes     Chronic    IVDU (intravenous drug user) [F19.90]  No    BC (acute kidney injury) [N17.9]  Unknown    Ureteral stent retained [Z96.0]  Not Applicable    Esophageal varices without bleeding [I85.00]  Unknown    COPD not affecting current episode of care [J44.9]  Unknown    HTN (hypertension) [I10]  Yes    Chronic hepatitis B with cirrhosis [B18.1, K74.60]  Yes      Resolved Hospital Problems   No resolved problems to display.        Plan:     Alcoholic cirrhosis of liver with ascites    MELD-Na score: 15 at 5/20/2022  2:30 AM  MELD score: 12 at 5/20/2022  2:30 AM  Calculated from:  Serum Creatinine: 1.0 mg/dL at 5/20/2022  2:30 AM  Serum Sodium: 134 mmol/L at 5/20/2022  2:30 AM  Total Bilirubin: 2.2 mg/dL at 5/20/2022  2:30 AM  INR(ratio): 1.3 at 5/20/2022  2:30 AM  Age: 56 years  55 y/o M hx of alcoholic cirrhosis, Hep B cirrhosis, HTN presents from hepatology clinic with decompensated cirrhosis and for tx eval. Pt underwent paracentesis in clinic with labs sent. Patient afebrile, HDS and in no distress on admission. Hepatology consulted, appreciate their input.      Pt to be transferred to [facility] when space available per hepatology. Will continue tx eval and workup.      Plan:  -- current meld 17  -- hepatology consulted on admission  -- lactulose 30 TID; titrate to 3 BM per day  -- discontinuing HRS protocol per hepatology on 5/18  -- will resume low-dose lasix, spironolactone on 5/19 am  -- D/C ctx as low suspicion for SBP and para studies negative for SBP  -- US liver w/ doppler ordered  -- daily CBC, CMP, INR        Esophageal varices without bleeding  Last EGD 2/2022 at OSH:  Grade  I varices were found in the lower third of the esophagus. Scarring noted from prior banding. Moderate portal hypertensive gastropathy was found in the entire examined stomach. Diffuse moderately erythematous mucosa without active bleeding and with   no stigmata of bleeding was found in the duodenal bulb.   Was recommended for Inderal LA 80mg daily, Lasix 40 BID, Aldactone 100mg BID, and Protonix 40mg daily  F/u 3 months and US portal vein doppler (not in CareEverywhere)                  - resuming low-dose lasix and spironolactone on 5/19           Ureteral stent retained           BC (acute kidney injury)  Patient with acute kidney injury likely d/t Pre-renal azotemia . Labs reviewed- Renal function/electrolytes with Estimated Creatinine Clearance: 59.6 mL/min (A) (based on SCr of 1.7 mg/dL (H)). according to latest data. Monitor urine output and serial BMP and adjust therapy as needed. Avoid nephrotoxins and renally dose meds for GFR listed above.      Concern for HRS given current decompensated liver cirrrhosis vs volume depletion s/o poor PO intake, though high suspicion at this time for HRS. On 5/18 pt Cr improving to 1.3, ok to discontinue HRS protocol per hepatology.      -- resume low-dose lasix, spironolactone on 5/19 am  -- discontinue albumin, octreotide, midodrine  -- avoid nephrotoxic medications  -- renally dose meds  -- CT AP with nonobstructive renal calculi, no hydronephrosis        Chronic hepatitis B with cirrhosis  -- continue Tenofovir 300 mg qd           HTN (hypertension)  Patient reports history of HTN, not currently on any antihypertensive medications. Patient normotensive in ED. Softer pressures while admitted. Will continue to monitor.             HIGH RISK CONDITION(S):  Patient has a condition that poses threat to life and bodily function: Acute Renal Failure          Zayda Guerrero MD

## 2022-05-20 NOTE — NURSING
Patient complained of leg cramps and pain. Provider notified, orders placed, patient medicated.    0146 Patient continues to complain of leg pain, provider notified, orders placed and patient medicated.

## 2022-05-20 NOTE — PROGRESS NOTES
[facility]  Hepatology  Progress note    Patient Name: Nic Munoz  MRN: [id number]  Admission Date: 5/17/2022  Hospital Length of Stay: 1 days  Code Status: Full Code   Attending Provider: Zayda Guerrero MD   Consulting Provider: Sahra Mcclellan MD  Primary Care Physician: Primary Doctor No  Principal Problem:Alcoholic cirrhosis of liver with ascites    Subjective:     HPI: Nic Munoz is a 56 y.o. male with history of alcoholic cirrhosis, HTN, hepatitis B (on tenofovir) is being admitted from hepatology clinic for inpatient transplant evaluation for decompensated alcoholic cirrhosis. Pt comes from [place], [place]. Pt reports long history of alcoholic cirrhosis (last quit alcohol 4 years ago); he states he has been receiving regular large-volume paracenteses for the past 10 months (before yesterday last one on 5/12 when he was admitted at [facility] for HE). On discharge -he was referred last week to [facility] transplant team for a TIPS EVAL. He was evaluated in clinic day of admission (5/17) with Dr. Yan and was deemed not a candidate for TIPS currently given MELD of 20. He underwent a paracentesis with IR again on 5/17 and 5L was removed and then was directed [place] [facility] [place] for admission to the hospital for transplant workup. On interview pt reports feeling significantly better post-para. He denies any current acute complaints. He denies fever, chills, nausea, vomiting. Abdominal pain improved post-para. He does report significant LE swelling.      In the ED, Pt HDS, afebrile, /74, HR 70s. CBC without leukocytosis, mild anemia with hgb 13.2, Plt 132. CMP notable for Na 131, Cr elevated to 1.7 (from baseline 1.0). Ammonia 70. INR 1.3. paracentesis labs pending at time of admission.      Last bowel movement 5 days ago, no overt signs of GIB, no abdominal pain/tenderness. SBP labs negative from 5/17. Again abdominal distention this AM. MELD down to 17 now as BC resolved.  Hgb from 15 down to 12 today.      Last Colonoscopy - 6 months ago at [facility] (per patient)      Interval history:  Awaiting insurance approval for liver transplant evaluation.  S/p paracentesis with the removal of 10 L of chylous fluid yesterday.  Meld 15.     No past medical history on file.    No past surgical history on file.    No family history on file.    Social History     Socioeconomic History    Marital status:        No current facility-administered medications on file prior to encounter.     Current Outpatient Medications on File Prior to Encounter   Medication Sig Dispense Refill    fluticasone furoate-vilanteroL (BREO) 200-25 mcg/dose DsDv diskus inhaler Inhale 1 puff into the lungs once daily.      tenofovir disoproxil fumarate (VIREAD) 300 mg Tab Take 300 mg by mouth.      furosemide (LASIX) 40 MG tablet Take 40 mg by mouth 2 (two) times daily.      lactulose (CHRONULAC) 10 gram/15 mL solution Take 30 mLs by mouth 2 (two) times daily.      oxybutynin (DITROPAN) 5 MG Tab Take 5 mg by mouth 3 (three) times daily.      pantoprazole (PROTONIX) 40 MG tablet Take 40 mg by mouth once daily.      SPIRIVA RESPIMAT 2.5 mcg/actuation inhaler Inhale 2 puffs into the lungs once daily.      spironolactone (ALDACTONE) 100 MG tablet Take 100 mg by mouth once daily.      SYMBICORT 160-4.5 mcg/actuation HFAA Inhale 2 puffs into the lungs 2 (two) times daily.         Review of patient's allergies indicates:  No Known Allergies      Objective:     Vitals:    05/20/22 1033   BP: 134/79   Pulse: 94   Resp:    Temp:        Significant Labs:  Recent Labs   Lab 05/18/22  0415 05/19/22  0257 05/20/22  0230   HGB 12.0* 13.8* 13.3*       Lab Results   Component Value Date    WBC 8.62 05/20/2022    HGB 13.3 (L) 05/20/2022    HCT 39.0 (L) 05/20/2022    MCV 88 05/20/2022    PLT 86 (L) 05/20/2022       Lab Results   Component Value Date     (L) 05/20/2022    K 4.1 05/20/2022      05/20/2022    CO2 21 (L) 05/20/2022    BUN 25 (H) 05/20/2022    CREATININE 1.0 05/20/2022    CALCIUM 8.5 (L) 05/20/2022    ANIONGAP 11 05/20/2022    ESTGFRAFRICA >60.0 05/20/2022    EGFRNONAA >60.0 05/20/2022       Lab Results   Component Value Date    ALT 23 05/20/2022    AST 37 05/20/2022    GGT 67 (H) 05/17/2022    ALKPHOS 122 05/20/2022    BILITOT 2.2 (H) 05/20/2022       Lab Results   Component Value Date    INR 1.3 (H) 05/20/2022    INR 1.2 05/19/2022    INR 1.3 (H) 05/18/2022           Significant Imaging:  Reviewed pertinent radiology findings.       Assessment/Plan:       MELD-Na score: 15 at 5/20/2022  2:30 AM  MELD score: 12 at 5/20/2022  2:30 AM  Calculated from:  Serum Creatinine: 1.0 mg/dL at 5/20/2022  2:30 AM  Serum Sodium: 134 mmol/L at 5/20/2022  2:30 AM  Total Bilirubin: 2.2 mg/dL at 5/20/2022  2:30 AM  INR(ratio): 1.3 at 5/20/2022  2:30 AM  Age: 56 years    Problem List:  1. Decompensated EtOH/HBV cirrhosis  2. Refractory ascites  3. BC, resolved    Assessment:  The patient presents with refractory ascites and BC which has since resolved.  Due to his elevated meld, plan to evaluate for liver transplant 1st prior to proceeding with tips.  Currently waiting financial approval for liver transplant evaluation.  Given CHI less appearance of ascitic fluid, would recommend obtaining triglycerides on next paracentesis.    Plan:  - paracentesis as needed  - awaiting financial approval for liver transplant evaluation, will need ID, dietary consult, social Work and surgeon evaluation, PFTs once that is approved  - continue diuresis, can increase spironolactone to 100 mg daily  - continue tenofovir, awaiting hepatitis-B serologies and HBV DNA    Please obtain daily CBC, BMP, LFT, INR  Thank you for involving us in the care of Nic Munoz. Please call with any additional questions, concerns or changes in the patient's clinical status.    Sahra Mcclellan MD  Gastroenterology & Hepatology Fellow PGY V    [facility]

## 2022-05-20 NOTE — DISCHARGE INSTRUCTIONS
MENTAL HEALTH/ADDICTIVE DISORDERS  REFERRAL RECOMMENDATIONS    Suboxone    [facility] - [phone]    [facility]  [address], [address], [place] [address]  [phone]; [phone]    [facility]  [phone]    Dr. Isidro Wasserman - can do monthly sublocade injection  [phone]  [address], [place]    Methadone Maintenance    [facility]   [address] [place] [address], [phone]  [address], [place], [place] [address] [phone]      I. AA ([phone]) www.[place].org/meetings/ or NA [phone])    II. [facility] (ABU) Intensive Outpatient Program [phone], option 2    III. Other Places for Outpatient Addictive Disorders and Mental Health Treatment in [facility]:    [facility] (Tarah Felipe, Paulette Ibarra; accepts Medicaid, commercial insurance) [phone]    [facility] ([place]) [phone], [address] [facility] [phone]; Crisis [phone] - Call for options A-E:    [facility] [place], [address] [facility] [place] [address]    [facility] [place], [address] [place] [address]    [facility] [place], [address] [place] [address],    [facility], 2nd floor [address] [place] [address]    [facility] [place], [address], [place] [address]    [facility] [place], [facility] [place] [address]    [facility], [address] [place] [address], [phone]    [facility], Sam[place]. [place]/Jacqui/Matteo/[place] [phone]    [facility] (Mental & General), [facility], [phone]    [facility] (Mental & General Health, not only HIV+, 3 [place] locations) [phone]    [facility], [address] [facility], [address], [phone]     [place]  [facility], [address] [facility], [phone], [phone]      IV. Addiction and Mental Health Treatment in Other [facility]:    [facility], [address] [address] [place], [phone]    [facility], [phone], [address] [facility], [address], [phone]    [facility] Human [facility]. [address] [facility], [phone]    [facility], [address] [facility], [phone]    [facility], [address] [facility], [phone] ([facility])    [facility], [address] [facility], [phone] ([facility])    [facility], [address], [address], [place], [phone] ([place], [place], and [facility])    [facility], [address] [facility], [phone] ([facility])    [facility] Hotline [phone], [phone]    [facility], [address] [facility], [facility], [address] [facility], [facility] [place], [address] [facility], [facility], [address] [facility] [address] [facility], [address], [place]    [facility], [address], [place] [phone]    [facility], [address] [facility] [phone]    [facility], [address] [facility], [phone]    [facility], [address] [facility], [phone]      V. Residential Addictive Disorders Treatment (call every day until you get in):    [facility] [address] [facility], [phone]    [facility] (men only) [address] [facility], [phone]    [facility], [facility],  "men's program [phone], women's program, [phone]    [facility], [address] [facility], [phone]    [facility] (Female only) [address] [facility], [phone]    [facility] (IOP, residential, low cost, MCaid) Denise Muñoz, [phone]    [facility] (Men only, [phone]), [address] [facility] [address], Sam    Family House (Pregnant/women with or without children, [phone])    [facility] (Women only), [address] [facility], [phone]    [facility] (men only)[facility] [phone]    VI. Inpatient Rehabs (out of area)    [facility], [place], [phone]     [facility], [phone]    [facility], [phone]    [facility], [phone]    [place], [place] ([phone])    [facility]) [phone]    VII. [place] Speaking:  Información de la reunión de Alcohólicos Anónimos  [facility], 10:00 am  Habla español  Esta reunión está abierta y cualquiera puede asistir.    [place] speaking Alcoholics anonymous meetings:  El "Jarad Jermyn AA Skype" es un jarad on line de Alcohólicos Anónimos en [facility]. El jarad es ashly, gratuito y virtual a través de Skype Audio. El jarad funciona mediante eber llamada grupal de voz, por lo que no se utiliza la videollamada, ni se pueden jimi las imágenes o rostros de los participantes. Hace kale años y medio abrimos el primer Jarad de AA por Skype en [place], caden actualmente asisten personas desde [place], [place], [place], [place], [place], [place], [place], [place], [place], [place], [place] y [place], entre otros.    El jarad es muy útil para los alcohólicos que residen en lugares donde no se celebran reuniones de AA, o residen en lugares donde las reuniones de AA son un número limitado de días a la semana, o para aquellos compañeros que se hayan de viaje o que, por cualquier motivo, se hayan convalecientes y no pueden desplazarse. Todos los días nos " reuniones a las 21:00 (hora española)    Podéis obtener más información sobre el anaid y domo sesiones en la página web [email or web address]    VIII. Suboxone Doctors in Other regions    [place], [place]:    [facility] - [address] [place] [address] - Tel: [phone]    Chester Rivera - [address] [place] [address] - Tel: [phone]    Vamsi Allen - [address] [facility] [place] [address] - Tel: [phone]    Rey Page - [address] [facility] [place] [address] - Tel: [phone]    Rickie Juares - [address] [place] [address] Tel:[phone]    [place], [place]:     Dr. Larry Johnson and Dr. Kirk Hoover - [address] [place], [place] - Tel: [phone]    Dr. Wen Crowder - [address]  [place], [place] - Tel: [phone]    Dr. Stevan Caldwell - Tel: [phone]    Dr. Travis [facility]pton [facility] - [phone]        VIII. In case of suicidal thinking, call the COPE LINE [phone] / [phone]

## 2022-05-20 NOTE — TELEPHONE ENCOUNTER
----- Message from Gayle Posadas sent at 5/20/2022  1:43 PM CDT -----  There was an issue with the HCV test ordered  05/18/2022.  The specimen was quantity not sufficient.  The ordered has been cancelled and will need to be reordered and recollected.  If there are any questions please call the sendout laboratory. Anyone in the sendout lab will be able to assist you.

## 2022-05-21 LAB
ALBUMIN SERPL BCP-MCNC: 2.8 G/DL (ref 3.5–5.2)
ALP SERPL-CCNC: 141 U/L (ref 55–135)
ALT SERPL W/O P-5'-P-CCNC: 29 U/L (ref 10–44)
AMPHET+METHAMPHET UR QL: NEGATIVE
ANION GAP SERPL CALC-SCNC: 8 MMOL/L (ref 8–16)
AST SERPL-CCNC: 47 U/L (ref 10–40)
BARBITURATES UR QL SCN>200 NG/ML: NEGATIVE
BASOPHILS # BLD AUTO: 0.05 K/UL (ref 0–0.2)
BASOPHILS NFR BLD: 0.6 % (ref 0–1.9)
BENZODIAZ UR QL SCN>200 NG/ML: NEGATIVE
BILIRUB SERPL-MCNC: 3.4 MG/DL (ref 0.1–1)
BUN SERPL-MCNC: 26 MG/DL (ref 6–20)
BZE UR QL SCN: NEGATIVE
CALCIUM SERPL-MCNC: 8.4 MG/DL (ref 8.7–10.5)
CANNABINOIDS UR QL SCN: NEGATIVE
CHLORIDE SERPL-SCNC: 101 MMOL/L (ref 95–110)
CO2 SERPL-SCNC: 23 MMOL/L (ref 23–29)
CREAT SERPL-MCNC: 1 MG/DL (ref 0.5–1.4)
CREAT UR-MCNC: 134 MG/DL (ref 23–375)
DIFFERENTIAL METHOD: ABNORMAL
EOSINOPHIL # BLD AUTO: 0.1 K/UL (ref 0–0.5)
EOSINOPHIL NFR BLD: 1.1 % (ref 0–8)
ERYTHROCYTE [DISTWIDTH] IN BLOOD BY AUTOMATED COUNT: 14.3 % (ref 11.5–14.5)
EST. GFR  (AFRICAN AMERICAN): >60 ML/MIN/1.73 M^2
EST. GFR  (NON AFRICAN AMERICAN): >60 ML/MIN/1.73 M^2
ETHANOL UR-MCNC: <10 MG/DL
GLUCOSE SERPL-MCNC: 142 MG/DL (ref 70–110)
HCT VFR BLD AUTO: 40.3 % (ref 40–54)
HGB BLD-MCNC: 13.8 G/DL (ref 14–18)
IMM GRANULOCYTES # BLD AUTO: 0.02 K/UL (ref 0–0.04)
IMM GRANULOCYTES NFR BLD AUTO: 0.2 % (ref 0–0.5)
INR PPP: 1.2 (ref 0.8–1.2)
LYMPHOCYTES # BLD AUTO: 0.8 K/UL (ref 1–4.8)
LYMPHOCYTES NFR BLD: 9.4 % (ref 18–48)
MCH RBC QN AUTO: 30.3 PG (ref 27–31)
MCHC RBC AUTO-ENTMCNC: 34.2 G/DL (ref 32–36)
MCV RBC AUTO: 88 FL (ref 82–98)
METHADONE UR QL SCN>300 NG/ML: NEGATIVE
MONOCYTES # BLD AUTO: 1.1 K/UL (ref 0.3–1)
MONOCYTES NFR BLD: 12.3 % (ref 4–15)
NEUTROPHILS # BLD AUTO: 6.8 K/UL (ref 1.8–7.7)
NEUTROPHILS NFR BLD: 76.4 % (ref 38–73)
NRBC BLD-RTO: 0 /100 WBC
OPIATES UR QL SCN: NEGATIVE
OSMOLALITY UR: 560 MOSM/KG (ref 50–1200)
PCP UR QL SCN>25 NG/ML: NEGATIVE
PLATELET # BLD AUTO: 77 K/UL (ref 150–450)
PMV BLD AUTO: 10.2 FL (ref 9.2–12.9)
POTASSIUM SERPL-SCNC: 4.1 MMOL/L (ref 3.5–5.1)
PROT SERPL-MCNC: 5.7 G/DL (ref 6–8.4)
PROT UR-MCNC: 33 MG/DL (ref 0–15)
PROT/CREAT UR: 0.25 MG/G{CREAT} (ref 0–0.2)
PROTHROMBIN TIME: 12.4 SEC (ref 9–12.5)
RBC # BLD AUTO: 4.56 M/UL (ref 4.6–6.2)
SODIUM SERPL-SCNC: 132 MMOL/L (ref 136–145)
SODIUM UR-SCNC: 13 MMOL/L (ref 20–250)
TOXICOLOGY INFORMATION: NORMAL
WBC # BLD AUTO: 8.84 K/UL (ref 3.9–12.7)

## 2022-05-21 PROCEDURE — 25000003 PHARM REV CODE 250: Mod: NTX | Performed by: HOSPITALIST

## 2022-05-21 PROCEDURE — 94761 N-INVAS EAR/PLS OXIMETRY MLT: CPT | Mod: NTX

## 2022-05-21 PROCEDURE — 12000002 HC ACUTE/MED SURGE SEMI-PRIVATE ROOM: Mod: NTX

## 2022-05-21 PROCEDURE — 25000003 PHARM REV CODE 250: Mod: NTX

## 2022-05-21 PROCEDURE — 36415 COLL VENOUS BLD VENIPUNCTURE: CPT | Mod: NTX | Performed by: STUDENT IN AN ORGANIZED HEALTH CARE EDUCATION/TRAINING PROGRAM

## 2022-05-21 PROCEDURE — 99232 SBSQ HOSP IP/OBS MODERATE 35: CPT | Mod: NTX,,, | Performed by: HOSPITALIST

## 2022-05-21 PROCEDURE — 80053 COMPREHEN METABOLIC PANEL: CPT | Mod: NTX | Performed by: HOSPITALIST

## 2022-05-21 PROCEDURE — 94640 AIRWAY INHALATION TREATMENT: CPT | Mod: NTX

## 2022-05-21 PROCEDURE — 99232 PR SUBSEQUENT HOSPITAL CARE,LEVL II: ICD-10-PCS | Mod: NTX,,, | Performed by: HOSPITALIST

## 2022-05-21 PROCEDURE — 85610 PROTHROMBIN TIME: CPT | Mod: NTX | Performed by: STUDENT IN AN ORGANIZED HEALTH CARE EDUCATION/TRAINING PROGRAM

## 2022-05-21 PROCEDURE — 63600175 PHARM REV CODE 636 W HCPCS: Mod: NTX | Performed by: HOSPITALIST

## 2022-05-21 PROCEDURE — 25000003 PHARM REV CODE 250: Mod: NTX | Performed by: STUDENT IN AN ORGANIZED HEALTH CARE EDUCATION/TRAINING PROGRAM

## 2022-05-21 PROCEDURE — 85025 COMPLETE CBC W/AUTO DIFF WBC: CPT | Mod: NTX | Performed by: HOSPITALIST

## 2022-05-21 RX ORDER — SPIRONOLACTONE 50 MG/1
100 TABLET, FILM COATED ORAL DAILY
Status: DISCONTINUED | OUTPATIENT
Start: 2022-05-22 | End: 2022-05-24

## 2022-05-21 RX ADMIN — FUROSEMIDE 40 MG: 40 TABLET ORAL at 09:05

## 2022-05-21 RX ADMIN — TIOTROPIUM BROMIDE INHALATION SPRAY 2 PUFF: 3.12 SPRAY, METERED RESPIRATORY (INHALATION) at 08:05

## 2022-05-21 RX ADMIN — PANTOPRAZOLE SODIUM 40 MG: 40 TABLET, DELAYED RELEASE ORAL at 09:05

## 2022-05-21 RX ADMIN — CYCLOBENZAPRINE HYDROCHLORIDE 10 MG: 5 TABLET, FILM COATED ORAL at 05:05

## 2022-05-21 RX ADMIN — FLUTICASONE FUROATE AND VILANTEROL TRIFENATATE 1 PUFF: 200; 25 POWDER RESPIRATORY (INHALATION) at 08:05

## 2022-05-21 RX ADMIN — RIFAXIMIN 550 MG: 550 TABLET ORAL at 09:05

## 2022-05-21 RX ADMIN — CYCLOBENZAPRINE HYDROCHLORIDE 10 MG: 5 TABLET, FILM COATED ORAL at 09:05

## 2022-05-21 RX ADMIN — ENOXAPARIN SODIUM 40 MG: 100 INJECTION SUBCUTANEOUS at 05:05

## 2022-05-21 RX ADMIN — TENOFOVIR DISPROXIL FUMARATE 300 MG: 300 TABLET ORAL at 09:05

## 2022-05-21 RX ADMIN — SPIRONOLACTONE 50 MG: 25 TABLET, FILM COATED ORAL at 09:05

## 2022-05-21 NOTE — PROGRESS NOTES
Progress Note  Hospital Medicine      Patient Name: Nic Munoz  MRN: [id number]  Patient Class: IP         Admission Date: 5/17/2022  Attending Physician: Zayda Guerrero   Primary Care Provider: Primary Doctor No    SUBJECTIVE:     Follow-up For:  Alcoholic cirrhosis of liver with ascites     Interval history/ROS:   5/21: resting comfortably    5/20: DSE today.     5/19: at paracentesis today during  Rounds, DSE for AM.     HPI:  57 y/o M hx of alcoholic cirrhosis, HTN, hepatitis B on antiviral therapy presents as a transfer to [facility] for hepatology eval for possible transplant. Pt comes from [place], [place]. Pt reports long history of cirrhosis; he states he has been receiving regular large-volume paracenteses for the past 10 months and he says they have been steadily increasing in frequency. He was referred last week to [facility] transplant team for a TIPS EVAL. He was evaluated in clinic day of admission with Dr. Yan and was deemed not a candidate for TIPS currently given MELD of 20. He underwent a paracentesis and then was directed to the [facility] ED for admission to the hospital for transplant workup. On interview pt reports feeling significantly better post-para. He denies any current acute complaints. He denies fever, chills, nausea, vomiting. Abdominal pain improved post-para. He does report significant LE swelling.      In the ED, Pt HDS, afebrile, /74, HR 70s. CBC without leukocytosis, mild anemia with hgb 13.2, Plt 132. CMP notable for Na 131, Cr elevated to 1.7 (from baseline 1.0). Ammonia 70. INR 1.3. paracentesis labs pending at time of admission.         Overview/Hospital Course:  Patient admitted for management of BC, decompensated cirrhosis, and hepatology eval. Pt given lactulose and ctx for SBP prophylaxis. Midodrine, octreotide, albumin given for likely HRS. Patient continued on home Tenofovir for HBV. Hepatology consulted on admission. The following day patient's Cr improving. CTAP  demonstrating nonobstructive renal calculi, no hydronephrosis. Paracentesis labs not c/w SBP, so Ctx discontinued. MELD improving. Will f/u hepatology recs. Pt taken off HRS protocol on 5/18 per hepatology. Hepatology will start tx workup. Anticipate transfer to IML team when space available.        OBJECTIVE:     Body mass index is 27.12 kg/m².    Vital Signs Range (Last 24H):  Temp:  [98.2 °F (36.8 °C)-99.7 °F (37.6 °C)]   Pulse:  []   Resp:  [16-20]   BP: (103-131)/(65-79)   SpO2:  [90 %-98 %]     I & O (Last 24H):    Intake/Output Summary (Last 24 hours) at 5/21/2022 1418  Last data filed at 5/21/2022 0200  Gross per 24 hour   Intake 240 ml   Output 400 ml   Net -160 ml        Physical Exam:  Vitals and nursing note reviewed.   Constitutional:       General: He is not in acute distress.     Appearance: He is ill-appearing. He is not toxic-appearing or diaphoretic.      Comments: Pleasant, ill-appearing male lying in bed in NAD.   HENT:      Head: Normocephalic and atraumatic.      Nose: Nose normal. No congestion.      Mouth/Throat:      Mouth: Mucous membranes are dry.      Pharynx: Oropharynx is clear.      Comments: Poor dentition  Eyes:      General: Scleral icterus present.      Extraocular Movements: Extraocular movements intact.      Pupils: Pupils are equal, round, and reactive to light.   Cardiovascular:      Rate and Rhythm: Normal rate and regular rhythm.      Pulses: Normal pulses.      Heart sounds: Normal heart sounds. No murmur heard.    No friction rub. No gallop.   Pulmonary:      Effort: Pulmonary effort is normal.      Breath sounds: Normal breath sounds.   Abdominal:      Comments: Abdomen distended. Nontender to palpation. Normoactive bowel sounds.    Musculoskeletal:      Cervical back: Normal range of motion and neck supple.      Right lower leg: Edema (3+ pitting) present.      Left lower leg: Edema (3+ pitting) present.   Skin:     General: Skin is warm and dry.      Coloration:  Skin is jaundiced.   Neurological:      General: No focal deficit present.      Mental Status: He is alert and oriented to person, place, and time.      Comments: No asterixis or tremors noted   Psychiatric:         Mood and Affect: Mood normal.         Behavior: Behavior normal.        Recent Labs   Lab 05/19/22 0257 05/20/22  0230 05/21/22  1047   * 134* 132*   K 3.7 4.1 4.1    102 101   CO2 22* 21* 23   BUN 26* 25* 26*   CREATININE 1.1 1.0 1.0   * 95 142*   CALCIUM 8.7 8.5* 8.4*   MG 1.9 1.7  --      Recent Labs   Lab 05/19/22  0257 05/20/22  0230 05/21/22  0250 05/21/22  1047   ALKPHOS 125 122  --  141*   ALT 27 23  --  29   AST 40 37  --  47*   ALBUMIN 2.8* 3.2*  --  2.8*   PROT 6.2 5.9*  --  5.7*   BILITOT 2.5* 2.2*  --  3.4*   INR 1.2 1.3* 1.2  --        Recent Labs   Lab 05/17/22  1117 05/17/22  1519 05/19/22  0257 05/19/22  1201 05/20/22  0230 05/21/22  1047   WBC  --    < > 8.73  --  8.62 8.84   HGB  --    < > 13.8*  --  13.3* 13.8*   HCT  --    < > 41.8  --  39.0* 40.3   PLT  --    < > 100*  --  86* 77*   GRAN  --    < > 75.4*  6.6  --  75.0*  6.5 76.4*  6.8   SEGS 30  --   --  14  --   --    LYMPH  --    < > 10.0*  0.9*  --  10.6*  0.9* 9.4*  0.8*   LYMPHS 61  --   --  78  --   --    MONO  --    < > 12.3  1.1*  --  12.4  1.1* 12.3  1.1*    < > = values in this interval not displayed.       No results for input(s): POCTGLUCOSE in the last 168 hours.    No results for input(s): TROPONINI in the last 168 hours.    Diagnostic Results:  Labs: Reviewed    enoxaparin, 40 mg, Subcutaneous, Daily  fluticasone furoate-vilanteroL, 1 puff, Inhalation, Daily  furosemide, 40 mg, Oral, Daily  lactulose, 30 g, Oral, TID  pantoprazole, 40 mg, Oral, Daily  rifAXImin, 550 mg, Oral, BID  [START ON 5/22/2022] spironolactone, 100 mg, Oral, Daily  tenofovir disoproxil fumarate, 300 mg, Oral, Daily  tiotropium bromide, 2 puff, Inhalation, Daily        As Needed albuterol, cyclobenzaprine, dextrose  10%, dextrose 10%, glucagon (human recombinant), glucose, glucose, naloxone, simethicone, sodium chloride 0.9%    ASSESSMENT/PLAN:     Assessment: Nic Munoz is a 56 y.o. male here with:     Active Hospital Problems    Diagnosis  POA    *Alcoholic cirrhosis of liver with ascites [K70.31]  Yes    Amphetamine use disorder, severe [F15.20]  Yes     Chronic    Alcohol use disorder, severe, in sustained remission [F10.21]  Yes     Chronic    IVDU (intravenous drug user) [F19.90]  No    BC (acute kidney injury) [N17.9]  Unknown    Ureteral stent retained [Z96.0]  Not Applicable    Esophageal varices without bleeding [I85.00]  Unknown    COPD not affecting current episode of care [J44.9]  Unknown    HTN (hypertension) [I10]  Yes    Chronic hepatitis B with cirrhosis [B18.1, K74.60]  Yes      Resolved Hospital Problems   No resolved problems to display.        Plan:     Alcoholic cirrhosis of liver with ascites    MELD-Na score: 17 at 5/21/2022 10:47 AM  MELD score: 13 at 5/21/2022 10:47 AM  Calculated from:  Serum Creatinine: 1.0 mg/dL at 5/21/2022 10:47 AM  Serum Sodium: 132 mmol/L at 5/21/2022 10:47 AM  Total Bilirubin: 3.4 mg/dL at 5/21/2022 10:47 AM  INR(ratio): 1.2 at 5/21/2022  2:50 AM  Age: 56 years  55 y/o M hx of alcoholic cirrhosis, Hep B cirrhosis, HTN presents from hepatology clinic with decompensated cirrhosis and for tx eval. Pt underwent paracentesis in clinic with labs sent. Patient afebrile, HDS and in no distress on admission. Hepatology consulted, appreciate their input.      Pt to be transferred to [facility] when space available per hepatology. Will continue tx eval and workup.      Plan:  -- current meld 17  -- hepatology consulted on admission  -- lactulose 30 TID; titrate to 3 BM per day  -- discontinuing HRS protocol per hepatology on 5/18  -- will resume low-dose lasix, spironolactone on 5/19 am  -- D/C ctx as low suspicion for SBP and para studies negative for SBP  -- US liver w/ doppler  ordered  -- daily CBC, CMP, INR        Esophageal varices without bleeding  Last EGD 2/2022 at OSH:  Grade I varices were found in the lower third of the esophagus. Scarring noted from prior banding. Moderate portal hypertensive gastropathy was found in the entire examined stomach. Diffuse moderately erythematous mucosa without active bleeding and with   no stigmata of bleeding was found in the duodenal bulb.   Was recommended for Inderal LA 80mg daily, Lasix 40 BID, Aldactone 100mg BID, and Protonix 40mg daily  F/u 3 months and US portal vein doppler (not in CareEverywhere)                  - resuming low-dose lasix and spironolactone on 5/19           Ureteral stent retained           BC (acute kidney injury)  Patient with acute kidney injury likely d/t Pre-renal azotemia . Labs reviewed- Renal function/electrolytes with Estimated Creatinine Clearance: 59.6 mL/min (A) (based on SCr of 1.7 mg/dL (H)). according to latest data. Monitor urine output and serial BMP and adjust therapy as needed. Avoid nephrotoxins and renally dose meds for GFR listed above.      Concern for HRS given current decompensated liver cirrrhosis vs volume depletion s/o poor PO intake, though high suspicion at this time for HRS. On 5/18 pt Cr improving to 1.3, ok to discontinue HRS protocol per hepatology.      -- resume low-dose lasix, spironolactone on 5/19 am  -- discontinue albumin, octreotide, midodrine  -- avoid nephrotoxic medications  -- renally dose meds  -- CT AP with nonobstructive renal calculi, no hydronephrosis        Chronic hepatitis B with cirrhosis  -- continue Tenofovir 300 mg qd           HTN (hypertension)  Patient reports history of HTN, not currently on any antihypertensive medications. Patient normotensive in ED. Softer pressures while admitted. Will continue to monitor.             HIGH RISK CONDITION(S):  Patient has a condition that poses threat to life and bodily function: Acute Renal Failure          Zayda MURRAY  MD Cesar

## 2022-05-21 NOTE — PLAN OF CARE
Problem: Adult Inpatient Plan of Care  Goal: Plan of Care Review  Outcome: Ongoing, Progressing  Goal: Patient-Specific Goal (Individualized)  Outcome: Ongoing, Progressing  Goal: Absence of Hospital-Acquired Illness or Injury  Outcome: Ongoing, Progressing  Goal: Optimal Comfort and Wellbeing  Outcome: Ongoing, Progressing  Goal: Readiness for Transition of Care  Outcome: Ongoing, Progressing     Problem: Fluid and Electrolyte Imbalance (Acute Kidney Injury/Impairment)  Goal: Fluid and Electrolyte Balance  Outcome: Ongoing, Progressing     Problem: Oral Intake Inadequate (Acute Kidney Injury/Impairment)  Goal: Optimal Nutrition Intake  Outcome: Ongoing, Progressing  Patient had no acute episodes during night. Safety measures are in place, bed in lowest position, call bell and personal items are within reach.

## 2022-05-22 LAB
ALBUMIN SERPL BCP-MCNC: 3 G/DL (ref 3.5–5.2)
ALP SERPL-CCNC: 140 U/L (ref 55–135)
ALT SERPL W/O P-5'-P-CCNC: 29 U/L (ref 10–44)
ANION GAP SERPL CALC-SCNC: 10 MMOL/L (ref 8–16)
AST SERPL-CCNC: 47 U/L (ref 10–40)
BASOPHILS # BLD AUTO: 0.06 K/UL (ref 0–0.2)
BASOPHILS NFR BLD: 0.6 % (ref 0–1.9)
BILIRUB SERPL-MCNC: 2.6 MG/DL (ref 0.1–1)
BUN SERPL-MCNC: 25 MG/DL (ref 6–20)
CALCIUM SERPL-MCNC: 9.1 MG/DL (ref 8.7–10.5)
CHLORIDE SERPL-SCNC: 99 MMOL/L (ref 95–110)
CO2 SERPL-SCNC: 26 MMOL/L (ref 23–29)
CREAT SERPL-MCNC: 1.1 MG/DL (ref 0.5–1.4)
DIFFERENTIAL METHOD: ABNORMAL
EOSINOPHIL # BLD AUTO: 0.1 K/UL (ref 0–0.5)
EOSINOPHIL NFR BLD: 1.2 % (ref 0–8)
ERYTHROCYTE [DISTWIDTH] IN BLOOD BY AUTOMATED COUNT: 14.3 % (ref 11.5–14.5)
EST. GFR  (AFRICAN AMERICAN): >60 ML/MIN/1.73 M^2
EST. GFR  (NON AFRICAN AMERICAN): >60 ML/MIN/1.73 M^2
GLUCOSE SERPL-MCNC: 91 MG/DL (ref 70–110)
HCT VFR BLD AUTO: 42.1 % (ref 40–54)
HGB BLD-MCNC: 14.2 G/DL (ref 14–18)
IMM GRANULOCYTES # BLD AUTO: 0.04 K/UL (ref 0–0.04)
IMM GRANULOCYTES NFR BLD AUTO: 0.4 % (ref 0–0.5)
INR PPP: 1.2 (ref 0.8–1.2)
LYMPHOCYTES # BLD AUTO: 1.2 K/UL (ref 1–4.8)
LYMPHOCYTES NFR BLD: 11.3 % (ref 18–48)
MCH RBC QN AUTO: 30.3 PG (ref 27–31)
MCHC RBC AUTO-ENTMCNC: 33.7 G/DL (ref 32–36)
MCV RBC AUTO: 90 FL (ref 82–98)
MONOCYTES # BLD AUTO: 1.4 K/UL (ref 0.3–1)
MONOCYTES NFR BLD: 13.9 % (ref 4–15)
NEUTROPHILS # BLD AUTO: 7.5 K/UL (ref 1.8–7.7)
NEUTROPHILS NFR BLD: 72.6 % (ref 38–73)
NRBC BLD-RTO: 0 /100 WBC
PLATELET # BLD AUTO: 85 K/UL (ref 150–450)
PMV BLD AUTO: 10.6 FL (ref 9.2–12.9)
POTASSIUM SERPL-SCNC: 4.4 MMOL/L (ref 3.5–5.1)
PROT SERPL-MCNC: 6.3 G/DL (ref 6–8.4)
PROTHROMBIN TIME: 12.4 SEC (ref 9–12.5)
RBC # BLD AUTO: 4.69 M/UL (ref 4.6–6.2)
SODIUM SERPL-SCNC: 135 MMOL/L (ref 136–145)
WBC # BLD AUTO: 10.35 K/UL (ref 3.9–12.7)

## 2022-05-22 PROCEDURE — 25000003 PHARM REV CODE 250: Mod: NTX | Performed by: STUDENT IN AN ORGANIZED HEALTH CARE EDUCATION/TRAINING PROGRAM

## 2022-05-22 PROCEDURE — 36415 COLL VENOUS BLD VENIPUNCTURE: CPT | Mod: NTX | Performed by: STUDENT IN AN ORGANIZED HEALTH CARE EDUCATION/TRAINING PROGRAM

## 2022-05-22 PROCEDURE — 99232 PR SUBSEQUENT HOSPITAL CARE,LEVL II: ICD-10-PCS | Mod: NTX,,, | Performed by: HOSPITALIST

## 2022-05-22 PROCEDURE — 25000003 PHARM REV CODE 250: Mod: NTX

## 2022-05-22 PROCEDURE — 94640 AIRWAY INHALATION TREATMENT: CPT | Mod: NTX

## 2022-05-22 PROCEDURE — 99232 SBSQ HOSP IP/OBS MODERATE 35: CPT | Mod: NTX,,, | Performed by: HOSPITALIST

## 2022-05-22 PROCEDURE — 25000003 PHARM REV CODE 250: Mod: NTX | Performed by: HOSPITALIST

## 2022-05-22 PROCEDURE — 80053 COMPREHEN METABOLIC PANEL: CPT | Mod: NTX | Performed by: HOSPITALIST

## 2022-05-22 PROCEDURE — 85025 COMPLETE CBC W/AUTO DIFF WBC: CPT | Mod: NTX | Performed by: HOSPITALIST

## 2022-05-22 PROCEDURE — 63600175 PHARM REV CODE 636 W HCPCS: Mod: NTX | Performed by: HOSPITALIST

## 2022-05-22 PROCEDURE — 12000002 HC ACUTE/MED SURGE SEMI-PRIVATE ROOM: Mod: NTX

## 2022-05-22 PROCEDURE — 94761 N-INVAS EAR/PLS OXIMETRY MLT: CPT | Mod: NTX

## 2022-05-22 PROCEDURE — 85610 PROTHROMBIN TIME: CPT | Mod: NTX | Performed by: STUDENT IN AN ORGANIZED HEALTH CARE EDUCATION/TRAINING PROGRAM

## 2022-05-22 RX ADMIN — FUROSEMIDE 40 MG: 40 TABLET ORAL at 08:05

## 2022-05-22 RX ADMIN — TENOFOVIR DISPROXIL FUMARATE 300 MG: 300 TABLET ORAL at 08:05

## 2022-05-22 RX ADMIN — RIFAXIMIN 550 MG: 550 TABLET ORAL at 08:05

## 2022-05-22 RX ADMIN — FLUTICASONE FUROATE AND VILANTEROL TRIFENATATE 1 PUFF: 200; 25 POWDER RESPIRATORY (INHALATION) at 08:05

## 2022-05-22 RX ADMIN — PANTOPRAZOLE SODIUM 40 MG: 40 TABLET, DELAYED RELEASE ORAL at 08:05

## 2022-05-22 RX ADMIN — ENOXAPARIN SODIUM 40 MG: 100 INJECTION SUBCUTANEOUS at 05:05

## 2022-05-22 RX ADMIN — CYCLOBENZAPRINE HYDROCHLORIDE 10 MG: 5 TABLET, FILM COATED ORAL at 05:05

## 2022-05-22 RX ADMIN — SPIRONOLACTONE 100 MG: 50 TABLET ORAL at 08:05

## 2022-05-22 RX ADMIN — TIOTROPIUM BROMIDE INHALATION SPRAY 2 PUFF: 3.12 SPRAY, METERED RESPIRATORY (INHALATION) at 08:05

## 2022-05-22 NOTE — PLAN OF CARE
Patient in bed eyes closed resting, easy to arouse and verbalize no needs or concerns. Patient endorse depression. Patient also want to know what is next in his process and when he will go home. Patient denies other needs or concerns. No acute episodes during night. Safety measures are in place, bed in lowest position, call bell and personal items are within reach.    Problem: Adult Inpatient Plan of Care  Goal: Plan of Care Review  Outcome: Ongoing, Progressing  Goal: Patient-Specific Goal (Individualized)  Outcome: Ongoing, Progressing  Goal: Optimal Comfort and Wellbeing  Outcome: Ongoing, Progressing  Goal: Readiness for Transition of Care  Outcome: Ongoing, Progressing     Problem: Fluid and Electrolyte Imbalance (Acute Kidney Injury/Impairment)  Goal: Fluid and Electrolyte Balance  Outcome: Ongoing, Progressing     Problem: Renal Function Impairment (Acute Kidney Injury/Impairment)  Goal: Effective Renal Function  Outcome: Ongoing, Progressing

## 2022-05-22 NOTE — PROGRESS NOTES
Progress Note  Hospital Medicine      Patient Name: Nic Munoz  MRN: [id number]  Patient Class: IP         Admission Date: 5/17/2022  Attending Physician: Zayda Guerrero   Primary Care Provider: Primary Doctor No    SUBJECTIVE:     Follow-up For:  Alcoholic cirrhosis of liver with ascites     Interval history/ROS:  5/22: no acute issues      5/21: resting comfortably    5/20: DSE today.     5/19: at paracentesis today during  Rounds, DSE for AM.     HPI:  57 y/o M hx of alcoholic cirrhosis, HTN, hepatitis B on antiviral therapy presents as a transfer to [facility] for hepatology eval for possible transplant. Pt comes from [place], [place]. Pt reports long history of cirrhosis; he states he has been receiving regular large-volume paracenteses for the past 10 months and he says they have been steadily increasing in frequency. He was referred last week to [facility] transplant team for a TIPS EVAL. He was evaluated in clinic day of admission with Dr. Yan and was deemed not a candidate for TIPS currently given MELD of 20. He underwent a paracentesis and then was directed to the [facility] ED for admission to the hospital for transplant workup. On interview pt reports feeling significantly better post-para. He denies any current acute complaints. He denies fever, chills, nausea, vomiting. Abdominal pain improved post-para. He does report significant LE swelling.      In the ED, Pt HDS, afebrile, /74, HR 70s. CBC without leukocytosis, mild anemia with hgb 13.2, Plt 132. CMP notable for Na 131, Cr elevated to 1.7 (from baseline 1.0). Ammonia 70. INR 1.3. paracentesis labs pending at time of admission.         Overview/Hospital Course:  Patient admitted for management of BC, decompensated cirrhosis, and hepatology eval. Pt given lactulose and ctx for SBP prophylaxis. Midodrine, octreotide, albumin given for likely HRS. Patient continued on home Tenofovir for HBV. Hepatology consulted on admission. The following day patient's  Cr improving. CTAP demonstrating nonobstructive renal calculi, no hydronephrosis. Paracentesis labs not c/w SBP, so Ctx discontinued. MELD improving. Will f/u hepatology recs. Pt taken off HRS protocol on 5/18 per hepatology. Hepatology will start tx workup. Anticipate transfer to IML team when space available.        OBJECTIVE:     Body mass index is 27.12 kg/m².    Vital Signs Range (Last 24H):  Temp:  [98 °F (36.7 °C)-98.5 °F (36.9 °C)]   Pulse:  []   Resp:  [18]   BP: (109-118)/(69-78)   SpO2:  [90 %-98 %]     I & O (Last 24H):    Intake/Output Summary (Last 24 hours) at 5/22/2022 0612  Last data filed at 5/21/2022 2300  Gross per 24 hour   Intake 240 ml   Output 400 ml   Net -160 ml        Physical Exam:  Vitals and nursing note reviewed.   Constitutional:       General: He is not in acute distress.     Appearance: He is ill-appearing. He is not toxic-appearing or diaphoretic.      Comments: Pleasant, ill-appearing male lying in bed in NAD.   HENT:      Head: Normocephalic and atraumatic.      Nose: Nose normal. No congestion.      Mouth/Throat:      Mouth: Mucous membranes are dry.      Pharynx: Oropharynx is clear.      Comments: Poor dentition  Eyes:      General: Scleral icterus present.      Extraocular Movements: Extraocular movements intact.      Pupils: Pupils are equal, round, and reactive to light.   Cardiovascular:      Rate and Rhythm: Normal rate and regular rhythm.      Pulses: Normal pulses.      Heart sounds: Normal heart sounds. No murmur heard.    No friction rub. No gallop.   Pulmonary:      Effort: Pulmonary effort is normal.      Breath sounds: Normal breath sounds.   Abdominal:      Comments: Abdomen distended. Nontender to palpation. Normoactive bowel sounds.    Musculoskeletal:      Cervical back: Normal range of motion and neck supple.      Right lower leg: Edema (3+ pitting) present.      Left lower leg: Edema (3+ pitting) present.   Skin:     General: Skin is warm and dry.       Coloration: Skin is jaundiced.   Neurological:      General: No focal deficit present.      Mental Status: He is alert and oriented to person, place, and time.      Comments: No asterixis or tremors noted   Psychiatric:         Mood and Affect: Mood normal.         Behavior: Behavior normal.        Recent Labs   Lab 05/20/22  0230 05/21/22  1047 05/22/22  0309   * 132* 135*   K 4.1 4.1 4.4    101 99   CO2 21* 23 26   BUN 25* 26* 25*   CREATININE 1.0 1.0 1.1   GLU 95 142* 91   CALCIUM 8.5* 8.4* 9.1   MG 1.7  --   --      Recent Labs   Lab 05/20/22  0230 05/21/22  0250 05/21/22  1047 05/22/22  0309   ALKPHOS 122  --  141* 140*   ALT 23  --  29 29   AST 37  --  47* 47*   ALBUMIN 3.2*  --  2.8* 3.0*   PROT 5.9*  --  5.7* 6.3   BILITOT 2.2*  --  3.4* 2.6*   INR 1.3* 1.2  --  1.2       Recent Labs   Lab 05/17/22  1117 05/17/22  1519 05/19/22  1201 05/20/22  0230 05/21/22  1047 05/22/22  0309   WBC  --    < >  --  8.62 8.84 10.35   HGB  --    < >  --  13.3* 13.8* 14.2   HCT  --    < >  --  39.0* 40.3 42.1   PLT  --    < >  --  86* 77* 85*   GRAN  --    < >  --  75.0*  6.5 76.4*  6.8 72.6  7.5   SEGS 30  --  14  --   --   --    LYMPH  --    < >  --  10.6*  0.9* 9.4*  0.8* 11.3*  1.2   LYMPHS 61  --  78  --   --   --    MONO  --    < >  --  12.4  1.1* 12.3  1.1* 13.9  1.4*    < > = values in this interval not displayed.       No results for input(s): POCTGLUCOSE in the last 168 hours.    No results for input(s): TROPONINI in the last 168 hours.    Diagnostic Results:  Labs: Reviewed    enoxaparin, 40 mg, Subcutaneous, Daily  fluticasone furoate-vilanteroL, 1 puff, Inhalation, Daily  furosemide, 40 mg, Oral, Daily  lactulose, 30 g, Oral, TID  pantoprazole, 40 mg, Oral, Daily  rifAXImin, 550 mg, Oral, BID  spironolactone, 100 mg, Oral, Daily  tenofovir disoproxil fumarate, 300 mg, Oral, Daily  tiotropium bromide, 2 puff, Inhalation, Daily        As Needed albuterol, cyclobenzaprine, dextrose 10%, dextrose  10%, glucagon (human recombinant), glucose, glucose, naloxone, simethicone, sodium chloride 0.9%    ASSESSMENT/PLAN:     Assessment: Nic Munoz is a 56 y.o. male here with:     Active Hospital Problems    Diagnosis  POA    *Alcoholic cirrhosis of liver with ascites [K70.31]  Yes    Amphetamine use disorder, severe [F15.20]  Yes     Chronic    Alcohol use disorder, severe, in sustained remission [F10.21]  Yes     Chronic    IVDU (intravenous drug user) [F19.90]  No    BC (acute kidney injury) [N17.9]  Unknown    Ureteral stent retained [Z96.0]  Not Applicable    Esophageal varices without bleeding [I85.00]  Unknown    COPD not affecting current episode of care [J44.9]  Unknown    HTN (hypertension) [I10]  Yes    Chronic hepatitis B with cirrhosis [B18.1, K74.60]  Yes      Resolved Hospital Problems   No resolved problems to display.        Plan:     Alcoholic cirrhosis of liver with ascites    MELD-Na score: 15 at 5/22/2022  3:09 AM  MELD score: 13 at 5/22/2022  3:09 AM  Calculated from:  Serum Creatinine: 1.1 mg/dL at 5/22/2022  3:09 AM  Serum Sodium: 135 mmol/L at 5/22/2022  3:09 AM  Total Bilirubin: 2.6 mg/dL at 5/22/2022  3:09 AM  INR(ratio): 1.2 at 5/22/2022  3:09 AM  Age: 56 years  55 y/o M hx of alcoholic cirrhosis, Hep B cirrhosis, HTN presents from hepatology clinic with decompensated cirrhosis and for tx eval. Pt underwent paracentesis in clinic with labs sent. Patient afebrile, HDS and in no distress on admission. Hepatology consulted, appreciate their input.      [facility]. Will continue tx eval and workup.      Plan:  -- current meld 17  -- hepatology consulted on admission  -- lactulose 30 TID; titrate to 3 BM per day  -- discontinuing HRS protocol per hepatology on 5/18  -- will resume low-dose lasix, spironolactone on 5/19 am  -- D/C ctx as low suspicion for SBP and para studies negative for SBP  -- US liver w/ doppler ordered  --  daily CBC, CMP, INR        Esophageal varices without bleeding  Last EGD 2/2022 at OSH:  Grade I varices were found in the lower third of the esophagus. Scarring noted from prior banding. Moderate portal hypertensive gastropathy was found in the entire examined stomach. Diffuse moderately erythematous mucosa without active bleeding and with   no stigmata of bleeding was found in the duodenal bulb.   Was recommended for Inderal LA 80mg daily, Lasix 40 BID, Aldactone 100mg BID, and Protonix 40mg daily  F/u 3 months and US portal vein doppler (not in CareEverywhere)                  - resuming low-dose lasix and spironolactone on 5/19           Ureteral stent retained           BC (acute kidney injury)  Patient with acute kidney injury likely d/t Pre-renal azotemia . Labs reviewed- Renal function/electrolytes with Estimated Creatinine Clearance: 59.6 mL/min (A) (based on SCr of 1.7 mg/dL (H)). according to latest data. Monitor urine output and serial BMP and adjust therapy as needed. Avoid nephrotoxins and renally dose meds for GFR listed above.      Concern for HRS given current decompensated liver cirrrhosis vs volume depletion s/o poor PO intake, though high suspicion at this time for HRS. On 5/18 pt Cr improving to 1.3, ok to discontinue HRS protocol per hepatology.      -- resume low-dose lasix, spironolactone on 5/19 am  -- discontinue albumin, octreotide, midodrine  -- avoid nephrotoxic medications  -- renally dose meds  -- CT AP with nonobstructive renal calculi, no hydronephrosis        Chronic hepatitis B with cirrhosis  -- continue Tenofovir 300 mg qd           HTN (hypertension)  Patient reports history of HTN, not currently on any antihypertensive medications. Patient normotensive in ED. Softer pressures while admitted. Will continue to monitor.             HIGH RISK CONDITION(S):  Patient has a condition that poses threat to life and bodily function: Acute Renal Failure          Zayda Guerrero MD

## 2022-05-22 NOTE — PLAN OF CARE
Problem: Adult Inpatient Plan of Care  Goal: Plan of Care Review  5/22/2022 1652 by Koki Dawson RN  Outcome: Ongoing, Progressing  5/22/2022 1652 by Koki Dawson RN  Outcome: Ongoing, Progressing  Goal: Patient-Specific Goal (Individualized)  5/22/2022 1652 by Koki Dawson RN  Outcome: Ongoing, Progressing  5/22/2022 1652 by Koki Dawson RN  Outcome: Ongoing, Progressing  Goal: Absence of Hospital-Acquired Illness or Injury  5/22/2022 1652 by Koki Dawson RN  Outcome: Ongoing, Progressing  5/22/2022 1652 by Koki Dawson RN  Outcome: Ongoing, Progressing  Goal: Optimal Comfort and Wellbeing  5/22/2022 1652 by Koki Dawson RN  Outcome: Ongoing, Progressing  5/22/2022 1652 by Koki Dawson RN  Outcome: Ongoing, Progressing  Goal: Readiness for Transition of Care  5/22/2022 1652 by Koki Dawson RN  Outcome: Ongoing, Progressing  5/22/2022 1652 by Koki Dawson RN  Outcome: Ongoing, Progressing     Problem: Fluid and Electrolyte Imbalance (Acute Kidney Injury/Impairment)  Goal: Fluid and Electrolyte Balance  5/22/2022 1652 by Koki Dawson RN  Outcome: Ongoing, Progressing  5/22/2022 1652 by Koki Dawson RN  Outcome: Ongoing, Progressing     Problem: Oral Intake Inadequate (Acute Kidney Injury/Impairment)  Goal: Optimal Nutrition Intake  5/22/2022 1652 by Koki Dawson RN  Outcome: Ongoing, Progressing  5/22/2022 1652 by Koki Dawson RN  Outcome: Ongoing, Progressing     Problem: Renal Function Impairment (Acute Kidney Injury/Impairment)  Goal: Effective Renal Function  Outcome: Ongoing, Progressing     Problem: Pain Acute  Goal: Acceptable Pain Control and Functional Ability  Outcome: Ongoing, Progressing     Problem: Fatigue  Goal: Improved Activity Tolerance  Outcome: Ongoing, Progressing     Pt lying in bed turned to right side. Abdominal distention noted and pt states he feels better lying flat on his side. Able to voice needs to staff. C/O aching in both legs. Call light within reach. Will cont to  monitor.

## 2022-05-22 NOTE — PLAN OF CARE
Problem: Adult Inpatient Plan of Care  Goal: Plan of Care Review  Outcome: Ongoing, Progressing  Goal: Patient-Specific Goal (Individualized)  Outcome: Ongoing, Progressing  Goal: Absence of Hospital-Acquired Illness or Injury  Outcome: Ongoing, Progressing  Goal: Optimal Comfort and Wellbeing  Outcome: Ongoing, Progressing  Goal: Readiness for Transition of Care  Outcome: Ongoing, Progressing     Problem: Fluid and Electrolyte Imbalance (Acute Kidney Injury/Impairment)  Goal: Fluid and Electrolyte Balance  Outcome: Ongoing, Progressing     Problem: Renal Function Impairment (Acute Kidney Injury/Impairment)  Goal: Effective Renal Function  Outcome: Ongoing, Progressing     Problem: Oral Intake Inadequate (Acute Kidney Injury/Impairment)  Goal: Optimal Nutrition Intake  Outcome: Ongoing, Progressing     Pt lying supine in bed. Cont to refuse lactulose. Pt states he desires to go home. He feels down at times and cries because he would like to know the POC. Call light w/in reach. Will cont to monitor.

## 2022-05-23 ENCOUNTER — TELEPHONE (OUTPATIENT)
Dept: INTERNAL MEDICINE | Facility: CLINIC | Age: 57
End: 2022-05-23
Payer: MEDICAID

## 2022-05-23 LAB
ABO + RH BLD: NORMAL
ALBUMIN FLD-MCNC: 0.6 G/DL
ALBUMIN SERPL BCP-MCNC: 2.8 G/DL (ref 3.5–5.2)
ALP SERPL-CCNC: 149 U/L (ref 55–135)
ALT SERPL W/O P-5'-P-CCNC: 29 U/L (ref 10–44)
ANION GAP SERPL CALC-SCNC: 6 MMOL/L (ref 8–16)
APPEARANCE FLD: NORMAL
AST SERPL-CCNC: 50 U/L (ref 10–40)
BACTERIA SPEC AEROBE CULT: NO GROWTH
BASOPHILS # BLD AUTO: 0.06 K/UL (ref 0–0.2)
BASOPHILS NFR BLD: 0.5 % (ref 0–1.9)
BILIRUB SERPL-MCNC: 2.4 MG/DL (ref 0.1–1)
BLD GP AB SCN CELLS X3 SERPL QL: NORMAL
BODY FLD TYPE: NORMAL
BODY FLUID SOURCE, LDH: NORMAL
BUN SERPL-MCNC: 24 MG/DL (ref 6–20)
CALCIUM SERPL-MCNC: 8.8 MG/DL (ref 8.7–10.5)
CHLORIDE SERPL-SCNC: 99 MMOL/L (ref 95–110)
CO2 SERPL-SCNC: 27 MMOL/L (ref 23–29)
COLOR FLD: NORMAL
CREAT SERPL-MCNC: 1.1 MG/DL (ref 0.5–1.4)
DIFFERENTIAL METHOD: ABNORMAL
EOSINOPHIL # BLD AUTO: 0.2 K/UL (ref 0–0.5)
EOSINOPHIL NFR BLD: 1.6 % (ref 0–8)
ERYTHROCYTE [DISTWIDTH] IN BLOOD BY AUTOMATED COUNT: 14.2 % (ref 11.5–14.5)
EST. GFR  (AFRICAN AMERICAN): >60 ML/MIN/1.73 M^2
EST. GFR  (NON AFRICAN AMERICAN): >60 ML/MIN/1.73 M^2
GLUCOSE SERPL-MCNC: 97 MG/DL (ref 70–110)
GRAM STN SPEC: NORMAL
GRAM STN SPEC: NORMAL
HCT VFR BLD AUTO: 39.9 % (ref 40–54)
HGB BLD-MCNC: 13.7 G/DL (ref 14–18)
IMM GRANULOCYTES # BLD AUTO: 0.06 K/UL (ref 0–0.04)
IMM GRANULOCYTES NFR BLD AUTO: 0.5 % (ref 0–0.5)
INR PPP: 1.2 (ref 0.8–1.2)
LDH FLD L TO P-CCNC: 53 U/L
LYMPHOCYTES # BLD AUTO: 1.3 K/UL (ref 1–4.8)
LYMPHOCYTES NFR BLD: 10.9 % (ref 18–48)
LYMPHOCYTES NFR FLD MANUAL: 52 %
MCH RBC QN AUTO: 29.8 PG (ref 27–31)
MCHC RBC AUTO-ENTMCNC: 34.3 G/DL (ref 32–36)
MCV RBC AUTO: 87 FL (ref 82–98)
MESOTHL CELL NFR FLD MANUAL: 3 %
MONOCYTES # BLD AUTO: 1.8 K/UL (ref 0.3–1)
MONOCYTES NFR BLD: 15.2 % (ref 4–15)
MONOS+MACROS NFR FLD MANUAL: 9 %
NEUTROPHILS # BLD AUTO: 8.3 K/UL (ref 1.8–7.7)
NEUTROPHILS NFR BLD: 71.3 % (ref 38–73)
NEUTROPHILS NFR FLD MANUAL: 36 %
NRBC BLD-RTO: 0 /100 WBC
PLATELET # BLD AUTO: 94 K/UL (ref 150–450)
PMV BLD AUTO: 10.5 FL (ref 9.2–12.9)
POTASSIUM SERPL-SCNC: 4.2 MMOL/L (ref 3.5–5.1)
PROT FLD-MCNC: 1.2 G/DL
PROT SERPL-MCNC: 6.1 G/DL (ref 6–8.4)
PROTHROMBIN TIME: 12.3 SEC (ref 9–12.5)
RBC # BLD AUTO: 4.6 M/UL (ref 4.6–6.2)
SODIUM SERPL-SCNC: 132 MMOL/L (ref 136–145)
SPECIMEN SOURCE: NORMAL
SPECIMEN SOURCE: NORMAL
STRONGYLOIDES ANTIBODY IGG: POSITIVE
VARICELLA INTERPRETATION: POSITIVE
VARICELLA ZOSTER IGG: 1.91 ISR (ref 0–0.9)
WBC # BLD AUTO: 11.6 K/UL (ref 3.9–12.7)
WBC # FLD: 338 /CU MM

## 2022-05-23 PROCEDURE — 25000003 PHARM REV CODE 250: Mod: NTX | Performed by: HOSPITALIST

## 2022-05-23 PROCEDURE — 85025 COMPLETE CBC W/AUTO DIFF WBC: CPT | Mod: NTX | Performed by: HOSPITALIST

## 2022-05-23 PROCEDURE — 85610 PROTHROMBIN TIME: CPT | Mod: NTX | Performed by: STUDENT IN AN ORGANIZED HEALTH CARE EDUCATION/TRAINING PROGRAM

## 2022-05-23 PROCEDURE — 99232 PR SUBSEQUENT HOSPITAL CARE,LEVL II: ICD-10-PCS | Mod: NTX,,, | Performed by: HOSPITALIST

## 2022-05-23 PROCEDURE — 82042 OTHER SOURCE ALBUMIN QUAN EA: CPT | Mod: NTX | Performed by: HOSPITALIST

## 2022-05-23 PROCEDURE — 94640 AIRWAY INHALATION TREATMENT: CPT | Mod: NTX

## 2022-05-23 PROCEDURE — 87205 SMEAR GRAM STAIN: CPT | Mod: NTX | Performed by: HOSPITALIST

## 2022-05-23 PROCEDURE — 86901 BLOOD TYPING SEROLOGIC RH(D): CPT | Mod: NTX | Performed by: INTERNAL MEDICINE

## 2022-05-23 PROCEDURE — 25000242 PHARM REV CODE 250 ALT 637 W/ HCPCS: Mod: NTX | Performed by: STUDENT IN AN ORGANIZED HEALTH CARE EDUCATION/TRAINING PROGRAM

## 2022-05-23 PROCEDURE — 83615 LACTATE (LD) (LDH) ENZYME: CPT | Performed by: HOSPITALIST

## 2022-05-23 PROCEDURE — 25000003 PHARM REV CODE 250: Mod: NTX | Performed by: STUDENT IN AN ORGANIZED HEALTH CARE EDUCATION/TRAINING PROGRAM

## 2022-05-23 PROCEDURE — 99232 SBSQ HOSP IP/OBS MODERATE 35: CPT | Mod: NTX,,, | Performed by: HOSPITALIST

## 2022-05-23 PROCEDURE — 63600175 PHARM REV CODE 636 W HCPCS: Mod: JG,UD,NTX | Performed by: FAMILY MEDICINE

## 2022-05-23 PROCEDURE — 36415 COLL VENOUS BLD VENIPUNCTURE: CPT | Mod: NTX | Performed by: STUDENT IN AN ORGANIZED HEALTH CARE EDUCATION/TRAINING PROGRAM

## 2022-05-23 PROCEDURE — P9047 ALBUMIN (HUMAN), 25%, 50ML: HCPCS | Mod: JG,UD,NTX | Performed by: FAMILY MEDICINE

## 2022-05-23 PROCEDURE — 63600175 PHARM REV CODE 636 W HCPCS: Mod: NTX | Performed by: HOSPITALIST

## 2022-05-23 PROCEDURE — 87070 CULTURE OTHR SPECIMN AEROBIC: CPT | Mod: NTX | Performed by: HOSPITALIST

## 2022-05-23 PROCEDURE — 94761 N-INVAS EAR/PLS OXIMETRY MLT: CPT | Mod: NTX

## 2022-05-23 PROCEDURE — 80053 COMPREHEN METABOLIC PANEL: CPT | Mod: NTX | Performed by: HOSPITALIST

## 2022-05-23 PROCEDURE — 25000003 PHARM REV CODE 250: Mod: NTX

## 2022-05-23 PROCEDURE — 87075 CULTR BACTERIA EXCEPT BLOOD: CPT | Mod: NTX | Performed by: HOSPITALIST

## 2022-05-23 PROCEDURE — 20600001 HC STEP DOWN PRIVATE ROOM: Mod: NTX

## 2022-05-23 PROCEDURE — 84157 ASSAY OF PROTEIN OTHER: CPT | Mod: NTX | Performed by: HOSPITALIST

## 2022-05-23 PROCEDURE — 89051 BODY FLUID CELL COUNT: CPT | Performed by: HOSPITALIST

## 2022-05-23 RX ORDER — ALBUMIN HUMAN 250 G/1000ML
SOLUTION INTRAVENOUS
Status: COMPLETED | OUTPATIENT
Start: 2022-05-23 | End: 2022-05-23

## 2022-05-23 RX ORDER — ALBUMIN HUMAN 250 G/1000ML
SOLUTION INTRAVENOUS
Status: DISPENSED
Start: 2022-05-23 | End: 2022-05-24

## 2022-05-23 RX ADMIN — TIOTROPIUM BROMIDE INHALATION SPRAY 2 PUFF: 3.12 SPRAY, METERED RESPIRATORY (INHALATION) at 08:05

## 2022-05-23 RX ADMIN — CYCLOBENZAPRINE HYDROCHLORIDE 10 MG: 5 TABLET, FILM COATED ORAL at 12:05

## 2022-05-23 RX ADMIN — LACTULOSE 30 G: 20 SOLUTION ORAL at 04:05

## 2022-05-23 RX ADMIN — FLUTICASONE FUROATE AND VILANTEROL TRIFENATATE 1 PUFF: 200; 25 POWDER RESPIRATORY (INHALATION) at 08:05

## 2022-05-23 RX ADMIN — TENOFOVIR DISPROXIL FUMARATE 300 MG: 300 TABLET ORAL at 09:05

## 2022-05-23 RX ADMIN — ALBUMIN (HUMAN) 25 G: 25 SOLUTION INTRAVENOUS at 12:05

## 2022-05-23 RX ADMIN — PANTOPRAZOLE SODIUM 40 MG: 40 TABLET, DELAYED RELEASE ORAL at 09:05

## 2022-05-23 RX ADMIN — RIFAXIMIN 550 MG: 550 TABLET ORAL at 09:05

## 2022-05-23 RX ADMIN — LACTULOSE 30 G: 20 SOLUTION ORAL at 08:05

## 2022-05-23 RX ADMIN — ALBUMIN (HUMAN) 37.5 G: 25 SOLUTION INTRAVENOUS at 12:05

## 2022-05-23 RX ADMIN — CYCLOBENZAPRINE HYDROCHLORIDE 10 MG: 5 TABLET, FILM COATED ORAL at 08:05

## 2022-05-23 RX ADMIN — RIFAXIMIN 550 MG: 550 TABLET ORAL at 08:05

## 2022-05-23 RX ADMIN — SPIRONOLACTONE 100 MG: 50 TABLET ORAL at 09:05

## 2022-05-23 RX ADMIN — ALBUTEROL SULFATE 2 PUFF: 108 AEROSOL, METERED RESPIRATORY (INHALATION) at 08:05

## 2022-05-23 RX ADMIN — ENOXAPARIN SODIUM 40 MG: 100 INJECTION SUBCUTANEOUS at 06:05

## 2022-05-23 RX ADMIN — FUROSEMIDE 40 MG: 40 TABLET ORAL at 09:05

## 2022-05-23 NOTE — PROGRESS NOTES
.  PHARM.D. PRE-TRANSPLANT NOTE:    This patient's medication therapy was evaluated as part of his pre-transplant evaluation.      The following general pharmacologic concerns were noted: HBV Ag positive. Continue tenofovir post transplant     The following concerns for post-operative pain management were noted: N/A    The following pharmacologic concerns related to HCV therapy were noted: NA       This patient's medication profile was reviewed for considerations for DAA Hepatitis C therapy:    [X]  No current inducers of CYP 3A4 or PGP  [X]  No amiodarone on this patient's EMR profile in the last 24 months  [X]  No past or current atrial fibrillation on this patient's EMR profile       Current Facility-Administered Medications   Medication Dose Route Frequency Provider Last Rate Last Admin    albumin human 25% 25 % bottle             albumin human 25% 25 % bottle             albumin human 25% 25 % bottle             albuterol inhaler 2 puff  2 puff Inhalation Q6H PRN Wen Palma MD   2 puff at 05/23/22 0804    cyclobenzaprine tablet 10 mg  10 mg Oral TID PRN Romie Russell MD   10 mg at 05/23/22 0016    dextrose 10% bolus 125 mL  12.5 g Intravenous PRN Tomás iPzarro MD        dextrose 10% bolus 250 mL  25 g Intravenous PRN Tomás Pizarro MD        enoxaparin injection 40 mg  40 mg Subcutaneous Daily Zayda Guerrero MD   40 mg at 05/22/22 1712    fluticasone furoate-vilanteroL 200-25 mcg/dose diskus inhaler 1 puff  1 puff Inhalation Daily Zayda Guerrero MD   1 puff at 05/23/22 0802    furosemide tablet 40 mg  40 mg Oral Daily Wen Palma MD   40 mg at 05/23/22 0923    glucagon (human recombinant) injection 1 mg  1 mg Intramuscular PRN Tomás Pizarro MD        glucose chewable tablet 16 g  16 g Oral PRN Tomás Pizarro MD        glucose chewable tablet 24 g  24 g Oral PRN Tomás Pizarro MD        lactulose 20 gram/30 mL solution Soln 30 g  30 g Oral TID Wen Palma MD   30 g at  05/20/22 2017    naloxone 0.4 mg/mL injection 0.02 mg  0.02 mg Intravenous PRN Tomás Pizarro MD        pantoprazole EC tablet 40 mg  40 mg Oral Daily Wen Palma MD   40 mg at 05/23/22 0923    rifAXIMin tablet 550 mg  550 mg Oral BID Zayda Guerrero MD   550 mg at 05/23/22 0923    simethicone chewable tablet 80 mg  1 tablet Oral TID PRN Wen Palma MD   80 mg at 05/20/22 1504    sodium chloride 0.9% flush 10 mL  10 mL Intravenous Q12H PRN Tomás Pizarro MD        spironolactone tablet 100 mg  100 mg Oral Daily Zayda Guerrero MD   100 mg at 05/23/22 0923    tenofovir disoproxil fumarate tablet 300 mg  300 mg Oral Daily Tomás Pizarro MD   300 mg at 05/23/22 0923    tiotropium bromide 2.5 mcg/actuation inhaler 2 puff  2 puff Inhalation Daily Zayda Guerrero MD   2 puff at 05/23/22 0803           I am available for consultation and can be contacted, as needed by the other members of the Transplant team.

## 2022-05-23 NOTE — TELEPHONE ENCOUNTER
----- Message from Lakhwinder Power sent at 5/23/2022 12:27 PM CDT -----  Regarding: Specimen Issue  There was an issue with the Hep B Viral test ordered on this patient from 5/18/22    Unfortunately, the reference lab received a sample that was insufficient in volume (Quantity Not Sufficient;QNS) for testing. The order has been cancelled. You will need to reorder and contact the patient for recollection if clinically indicated.    If there are any questions, please call the Sendout lab at [phone] ext. [phone].  Anyone in the Sendout lab will be able to assist you.

## 2022-05-23 NOTE — PLAN OF CARE
"Patient in bed, eyes closed resting, easy to arouse. Patient complained of stomach pains, stating "my stomach has started hurting, its time for them to take some more fluid off me". Patient denies other needs or concerns at this time. Safety measures are in place, bed in lowest position, call bell and personal items are within reach.   Problem: Adult Inpatient Plan of Care  Goal: Plan of Care Review  Outcome: Ongoing, Progressing  Goal: Patient-Specific Goal (Individualized)  Outcome: Ongoing, Progressing  Goal: Absence of Hospital-Acquired Illness or Injury  Outcome: Ongoing, Progressing  Goal: Optimal Comfort and Wellbeing  Outcome: Ongoing, Progressing  Goal: Readiness for Transition of Care  Outcome: Ongoing, Progressing     Problem: Fluid and Electrolyte Imbalance (Acute Kidney Injury/Impairment)  Goal: Fluid and Electrolyte Balance  Outcome: Ongoing, Progressing     Problem: Oral Intake Inadequate (Acute Kidney Injury/Impairment)  Goal: Optimal Nutrition Intake  Outcome: Ongoing, Progressing     Problem: Fatigue  Goal: Improved Activity Tolerance  Outcome: Ongoing, Progressing     "

## 2022-05-23 NOTE — H&P
Inpatient Radiology Pre-procedure Note    History of Present Illness:  Nic Munoz is a 56 y.o. male who presents for ultrasound guided paracentesis.  Admission H&P reviewed.  No past medical history on file.  No past surgical history on file.    Review of Systems:   As documented in primary team H&P    Home Meds:   Prior to Admission medications    Medication Sig Start Date End Date Taking? Authorizing Provider   fluticasone furoate-vilanteroL (BREO) 200-25 mcg/dose DsDv diskus inhaler Inhale 1 puff into the lungs once daily. 5/12/22  Yes Historical Provider   tenofovir disoproxil fumarate (VIREAD) 300 mg Tab Take 300 mg by mouth. 1/28/22 1/28/23 Yes Historical Provider   furosemide (LASIX) 40 MG tablet Take 40 mg by mouth 2 (two) times daily. 4/21/22   Historical Provider   lactulose (CHRONULAC) 10 gram/15 mL solution Take 30 mLs by mouth 2 (two) times daily. 5/12/22   Historical Provider   oxybutynin (DITROPAN) 5 MG Tab Take 5 mg by mouth 3 (three) times daily. 3/16/22   Historical Provider   pantoprazole (PROTONIX) 40 MG tablet Take 40 mg by mouth once daily. 5/12/22   Historical Provider   SPIRIVA RESPIMAT 2.5 mcg/actuation inhaler Inhale 2 puffs into the lungs once daily. 5/12/22   Historical Provider   spironolactone (ALDACTONE) 100 MG tablet Take 100 mg by mouth once daily. 4/21/22   Historical Provider   SYMBICORT 160-4.5 mcg/actuation HFAA Inhale 2 puffs into the lungs 2 (two) times daily. 5/12/22   Historical Provider     Scheduled Meds:    enoxaparin  40 mg Subcutaneous Daily    fluticasone furoate-vilanteroL  1 puff Inhalation Daily    furosemide  40 mg Oral Daily    lactulose  30 g Oral TID    pantoprazole  40 mg Oral Daily    rifAXImin  550 mg Oral BID    spironolactone  100 mg Oral Daily    tenofovir disoproxil fumarate  300 mg Oral Daily    tiotropium bromide  2 puff Inhalation Daily     Continuous Infusions:   PRN Meds:albuterol, cyclobenzaprine, dextrose 10%, dextrose 10%, glucagon  (human recombinant), glucose, glucose, naloxone, simethicone, sodium chloride 0.9%  Anticoagulants/Antiplatelets: Lovenox    Allergies: Review of patient's allergies indicates:  No Known Allergies  Sedation Hx: have not been any systemic reactions    Vitals:  Temp: 97.2 °F (36.2 °C) (05/23/22 0809)  Pulse: 102 (05/23/22 0809)  Resp: 17 (05/23/22 0809)  BP: 126/87 (05/23/22 0809)  SpO2: 95 % (05/23/22 0809)     Physical Exam:  ASA: 3  Mallampati: n/a    General: no acute distress  Mental Status: alert and oriented to person, place and time  HEENT: normocephalic, atraumatic  Chest: unlabored breathing  Heart: regular heart rate  Abdomen: distended  Extremity: moves all extremities    Plan: ultrasound guided paracentesis  Sedation Plan: local    SALOME Sweet, JUDEP  Interventional Radiology  [phone] [facility]

## 2022-05-23 NOTE — PLAN OF CARE
Problem: Adult Inpatient Plan of Care  Goal: Plan of Care Review  Outcome: Ongoing, Progressing     Problem: Adult Inpatient Plan of Care  Goal: Absence of Hospital-Acquired Illness or Injury  Outcome: Ongoing, Progressing     Problem: Adult Inpatient Plan of Care  Goal: Optimal Comfort and Wellbeing  Outcome: Ongoing, Progressing     Pt. AAOx4, VSS. No falls or injuries during shift, safety maintained. Call light in reach, bed locked and in lowest position, side rails up x2. No acute events, went to IR for paracentesis dressing to R side of abdomen CDI. Will continue to monitor.

## 2022-05-23 NOTE — PROGRESS NOTES
Progress Note  Hospital Medicine      Patient Name: Nic Munoz  MRN: [id number]  Patient Class: IP         Admission Date: 5/17/2022  Attending Physician: Zayda Guerrero   Primary Care Provider: Primary Doctor No    SUBJECTIVE:     Follow-up For:  Alcoholic cirrhosis of liver with ascites     Interval history/ROS:  5/23: transplant eval, paracentesis    5/22: no acute issues      5/21: resting comfortably    5/20: DSE today.     5/19: at paracentesis today during  Rounds, DSE for AM.     HPI:  57 y/o M hx of alcoholic cirrhosis, HTN, hepatitis B on antiviral therapy presents as a transfer to [facility] [place] for hepatology eval for possible transplant. Pt comes from [place], [place]. Pt reports long history of cirrhosis; he states he has been receiving regular large-volume paracenteses for the past 10 months and he says they have been steadily increasing in frequency. He was referred last week to [facility] transplant team for a TIPS EVAL. He was evaluated in clinic day of admission with Dr. Yan and was deemed not a candidate for TIPS currently given MELD of 20. He underwent a paracentesis and then was directed to the [facility] ED for admission to the hospital for transplant workup. On interview pt reports feeling significantly better post-para. He denies any current acute complaints. He denies fever, chills, nausea, vomiting. Abdominal pain improved post-para. He does report significant LE swelling.      In the ED, Pt HDS, afebrile, /74, HR 70s. CBC without leukocytosis, mild anemia with hgb 13.2, Plt 132. CMP notable for Na 131, Cr elevated to 1.7 (from baseline 1.0). Ammonia 70. INR 1.3. paracentesis labs pending at time of admission.         Overview/Hospital Course:  Patient admitted for management of BC, decompensated cirrhosis, and hepatology eval. Pt given lactulose and ctx for SBP prophylaxis. Midodrine, octreotide, albumin given for likely HRS. Patient continued on home Tenofovir for HBV. Hepatology consulted on  admission. The following day patient's Cr improving. CTAP demonstrating nonobstructive renal calculi, no hydronephrosis. Paracentesis labs not c/w SBP, so Ctx discontinued. MELD improving. Will f/u hepatology recs. Pt taken off HRS protocol on 5/18 per hepatology. Hepatology will start tx workup. Anticipate transfer to [facility] team when space available.        OBJECTIVE:     Body mass index is 27.12 kg/m².    Vital Signs Range (Last 24H):  Temp:  [97.2 °F (36.2 °C)-98.7 °F (37.1 °C)]   Pulse:  []   Resp:  [15-20]   BP: (110-134)/(62-87)   SpO2:  [92 %-96 %]     I & O (Last 24H):    Intake/Output Summary (Last 24 hours) at 5/23/2022 1523  Last data filed at 5/23/2022 0400  Gross per 24 hour   Intake 240 ml   Output 900 ml   Net -660 ml        Physical Exam:  Vitals and nursing note reviewed.   Constitutional:       General: He is not in acute distress.     Appearance: He is ill-appearing. He is not toxic-appearing or diaphoretic.      Comments: Pleasant, ill-appearing male lying in bed in NAD.   HENT:      Head: Normocephalic and atraumatic.      Nose: Nose normal. No congestion.      Mouth/Throat:      Mouth: Mucous membranes are dry.      Pharynx: Oropharynx is clear.      Comments: Poor dentition  Eyes:      General: Scleral icterus present.      Extraocular Movements: Extraocular movements intact.      Pupils: Pupils are equal, round, and reactive to light.   Cardiovascular:      Rate and Rhythm: Normal rate and regular rhythm.      Pulses: Normal pulses.      Heart sounds: Normal heart sounds. No murmur heard.    No friction rub. No gallop.   Pulmonary:      Effort: Pulmonary effort is normal.      Breath sounds: Normal breath sounds.   Abdominal:      Comments: Abdomen distended. Nontender to palpation. Normoactive bowel sounds.    Musculoskeletal:      Cervical back: Normal range of motion and neck supple.      Right lower leg: Edema (3+ pitting) present.      Left lower leg: Edema (3+ pitting) present.    Skin:     General: Skin is warm and dry.      Coloration: Skin is jaundiced.   Neurological:      General: No focal deficit present.      Mental Status: He is alert and oriented to person, place, and time.      Comments: No asterixis or tremors noted   Psychiatric:         Mood and Affect: Mood normal.         Behavior: Behavior normal.        Recent Labs   Lab 05/21/22  1047 05/22/22  0309 05/23/22  0541   * 135* 132*   K 4.1 4.4 4.2    99 99   CO2 23 26 27   BUN 26* 25* 24*   CREATININE 1.0 1.1 1.1   * 91 97   CALCIUM 8.4* 9.1 8.8     Recent Labs   Lab 05/21/22  0250 05/21/22  1047 05/22/22  0309 05/23/22  0541   ALKPHOS  --  141* 140* 149*   ALT  --  29 29 29   AST  --  47* 47* 50*   ALBUMIN  --  2.8* 3.0* 2.8*   PROT  --  5.7* 6.3 6.1   BILITOT  --  3.4* 2.6* 2.4*   INR 1.2  --  1.2 1.2       Recent Labs   Lab 05/17/22  1117 05/17/22  1519 05/19/22  1201 05/20/22  0230 05/21/22  1047 05/22/22  0309 05/23/22  0541   WBC  --    < >  --    < > 8.84 10.35 11.60   HGB  --    < >  --    < > 13.8* 14.2 13.7*   HCT  --    < >  --    < > 40.3 42.1 39.9*   PLT  --    < >  --    < > 77* 85* 94*   GRAN  --    < >  --    < > 76.4*  6.8 72.6  7.5 71.3  8.3*   SEGS 30  --  14  --   --   --   --    LYMPH  --    < >  --    < > 9.4*  0.8* 11.3*  1.2 10.9*  1.3   LYMPHS 61  --  78  --   --   --   --    MONO  --    < >  --    < > 12.3  1.1* 13.9  1.4* 15.2*  1.8*    < > = values in this interval not displayed.       No results for input(s): POCTGLUCOSE in the last 168 hours.    No results for input(s): TROPONINI in the last 168 hours.    Diagnostic Results:  Labs: Reviewed    albumin human 25%, , ,   albumin human 25%, , ,   albumin human 25%, , ,   enoxaparin, 40 mg, Subcutaneous, Daily  fluticasone furoate-vilanteroL, 1 puff, Inhalation, Daily  furosemide, 40 mg, Oral, Daily  lactulose, 30 g, Oral, TID  pantoprazole, 40 mg, Oral, Daily  rifAXImin, 550 mg, Oral, BID  spironolactone, 100 mg, Oral,  Daily  tenofovir disoproxil fumarate, 300 mg, Oral, Daily  tiotropium bromide, 2 puff, Inhalation, Daily        As Needed albuterol, cyclobenzaprine, dextrose 10%, dextrose 10%, glucagon (human recombinant), glucose, glucose, naloxone, simethicone, sodium chloride 0.9%    ASSESSMENT/PLAN:     Assessment: Nic Munoz is a 56 y.o. male here with:     Active Hospital Problems    Diagnosis  POA    *Alcoholic cirrhosis of liver with ascites [K70.31]  Yes    Amphetamine use disorder, severe [F15.20]  Yes     Chronic    Alcohol use disorder, severe, in sustained remission [F10.21]  Yes     Chronic    IVDU (intravenous drug user) [F19.90]  No    BC (acute kidney injury) [N17.9]  Unknown    Ureteral stent retained [Z96.0]  Not Applicable    Esophageal varices without bleeding [I85.00]  Unknown    COPD not affecting current episode of care [J44.9]  Unknown    HTN (hypertension) [I10]  Yes    Chronic hepatitis B with cirrhosis [B18.1, K74.60]  Yes      Resolved Hospital Problems   No resolved problems to display.        Plan:     Alcoholic cirrhosis of liver with ascites    MELD-Na score: 17 at 5/23/2022  5:41 AM  MELD score: 13 at 5/23/2022  5:41 AM  Calculated from:  Serum Creatinine: 1.1 mg/dL at 5/23/2022  5:41 AM  Serum Sodium: 132 mmol/L at 5/23/2022  5:41 AM  Total Bilirubin: 2.4 mg/dL at 5/23/2022  5:41 AM  INR(ratio): 1.2 at 5/23/2022  5:41 AM  Age: 56 years  57 y/o M hx of alcoholic cirrhosis, Hep B cirrhosis, HTN presents from hepatology clinic with decompensated cirrhosis and for tx eval. Pt underwent paracentesis in clinic with labs sent. Patient afebrile, HDS and in no distress on admission. Hepatology consulted, appreciate their input.      Pt to be transferred to [facility] when space available per hepatology. Will continue tx eval and workup.      Plan:  -- current meld 17  -- hepatology consulted on admission  -- lactulose 30 TID; titrate to 3 BM per day  -- discontinuing HRS protocol per hepatology on  5/18  -- will resume low-dose lasix, spironolactone on 5/19 am  -- D/C ctx as low suspicion for SBP and para studies negative for SBP  -- US liver w/ doppler ordered  -- daily CBC, CMP, INR        Esophageal varices without bleeding  Last EGD 2/2022 at OSH:  Grade I varices were found in the lower third of the esophagus. Scarring noted from prior banding. Moderate portal hypertensive gastropathy was found in the entire examined stomach. Diffuse moderately erythematous mucosa without active bleeding and with   no stigmata of bleeding was found in the duodenal bulb.   Was recommended for Inderal LA 80mg daily, Lasix 40 BID, Aldactone 100mg BID, and Protonix 40mg daily  F/u 3 months and US portal vein doppler (not in CareEverywhere)                  - resuming low-dose lasix and spironolactone on 5/19           Ureteral stent retained           BC (acute kidney injury)  Patient with acute kidney injury likely d/t Pre-renal azotemia . Labs reviewed- Renal function/electrolytes with Estimated Creatinine Clearance: 59.6 mL/min (A) (based on SCr of 1.7 mg/dL (H)). according to latest data. Monitor urine output and serial BMP and adjust therapy as needed. Avoid nephrotoxins and renally dose meds for GFR listed above.      Concern for HRS given current decompensated liver cirrrhosis vs volume depletion s/o poor PO intake, though high suspicion at this time for HRS. On 5/18 pt Cr improving to 1.3, ok to discontinue HRS protocol per hepatology.      -- resume low-dose lasix, spironolactone on 5/19 am  -- discontinue albumin, octreotide, midodrine  -- avoid nephrotoxic medications  -- renally dose meds  -- CT AP with nonobstructive renal calculi, no hydronephrosis        Chronic hepatitis B with cirrhosis  -- continue Tenofovir 300 mg qd           HTN (hypertension)  Patient reports history of HTN, not currently on any antihypertensive medications. Patient normotensive in ED. Softer pressures while admitted. Will continue to  monitor.             HIGH RISK CONDITION(S):  Patient has a condition that poses threat to life and bodily function: Acute Renal Failure          Zayda Guerrero MD

## 2022-05-23 NOTE — CONSULTS
Consult Note  Liver Transplant Surgery    Consult Requested By:*  Reason for Consult: Liver transplant evaluation    SUBJECTIVE:     History of Present Illness: Patient is a 56 y.o. male with liver disease due to alcoholic liver disease.  Symptoms and complications of liver disease include ascites (recurrent paracentesis needed).     Scheduled Meds:   albumin human 25%        albumin human 25%        albumin human 25%        enoxaparin  40 mg Subcutaneous Daily    fluticasone furoate-vilanteroL  1 puff Inhalation Daily    furosemide  40 mg Oral Daily    lactulose  30 g Oral TID    pantoprazole  40 mg Oral Daily    rifAXImin  550 mg Oral BID    spironolactone  100 mg Oral Daily    tenofovir disoproxil fumarate  300 mg Oral Daily    tiotropium bromide  2 puff Inhalation Daily     Continuous Infusions:  PRN Meds:albuterol, cyclobenzaprine, dextrose 10%, dextrose 10%, glucagon (human recombinant), glucose, glucose, naloxone, simethicone, sodium chloride 0.9%    Review of patient's allergies indicates:  No Known Allergies    No past medical history on file.  No past surgical history on file.  No family history on file.        Review of Systems:  Constitutional: no fever or chills  Eyes: no visual changes  Respiratory: no cough or shortness of breath  Cardiovascular: no chest pain or palpitations  Gastrointestinal: no nausea or vomiting, no abdominal pain or change in bowel habits  Integument/Breast: no rash or pruritis  Musculoskeletal: no arthralgias or myalgias  Neurological: no seizures or tremors    OBJECTIVE:     Vital Signs (Most Recent)  Temp: 97.2 °F (36.2 °C) (05/23/22 0809)  Pulse: 100 (05/23/22 1248)  Resp: 17 (05/23/22 1248)  BP: 127/79 (05/23/22 1248)  SpO2: (!) 94 % (05/23/22 1248)    Vital Signs Range (Last 24H):  Temp:  [97.2 °F (36.2 °C)-98.7 °F (37.1 °C)]   Pulse:  []   Resp:  [15-20]   BP: (110-134)/(62-87)   SpO2:  [92 %-96 %]     Physical Exam:  General: appears older than stated  age, cachectic  HENT: Head:normocephalic, atraumatic. Ears:hearing grossly normal bilaterally. Nose: no discharge. Throat: lips, mucosa, and tongue normal; teeth and gums normal.  Eyes: conjunctivae/corneas clear. PERRL.   Neck: supple, symmetrical, trachea midline, no JVD and thyroid not enlarged, symmetric, no tenderness/mass/nodules  Lungs:  normal respiratory effort  Cardiovascular: Heart: regular rate and rhythm. Chest Wall: no tenderness. Extremities: no cyanosis or edema, or clubbing. Pulses: 2+ and symmetric.  Abdomen/Rectal: Abdomen: distended abdomen, nontender, bilateral hernia scars. Rectal: normal tone, no masses or tenderness;  Umbo hernia, reducible  Genitalia: deferred    Laboratory:  CBC:   Recent Labs   Lab 05/23/22  0541   WBC 11.60   RBC 4.60   HGB 13.7*   HCT 39.9*   PLT 94*     BMP:   Recent Labs   Lab 05/23/22  0541   GLU 97   *   K 4.2   CL 99   CO2 27   BUN 24*   CREATININE 1.1   CALCIUM 8.8     CMP:   Recent Labs   Lab 05/23/22  0541   GLU 97   CALCIUM 8.8   ALBUMIN 2.8*   PROT 6.1   *   K 4.2   CO2 27   CL 99   BUN 24*   CREATININE 1.1   ALKPHOS 149*   ALT 29   AST 50*   BILITOT 2.4*     Coagulation:   Recent Labs   Lab 05/17/22  1519 05/18/22  0415 05/23/22  0541   INR 1.3*   < > 1.2   APTT 27.1  --   --     < > = values in this interval not displayed.         ASSESSMENT/PLAN:     Patient Active Problem List   Diagnosis    Alcoholic cirrhosis of liver with ascites    HTN (hypertension)    Chronic hepatitis B with cirrhosis    BC (acute kidney injury)    Ureteral stent retained    Esophageal varices without bleeding    COPD not affecting current episode of care    Amphetamine use disorder, severe    Alcohol use disorder, severe, in sustained remission    IVDU (intravenous drug user)       Transplant Candidacy: Patient is a 56 y.o. year old male with alcoholic  cirrhosis being evaluated for possible OLT.  In my opinion, he is a suitable liver transplant candidate.  He  will be presented to selection committee after all tests and evaluations are complete.  UNOS Patient Status  Functional Status: 40% - Disabled: requires special care and assistance  Physical Capacity: Limited Mobility    Counseling: I discussed with the patient the benefits of liver transplantation.  We discussed the evaluation and listing procedures.  We discussed the MELD system and the associated waiting times.  We discussed national and center specific survival results.  We discussed the option of being multiply listed in different OPOs.   We discussed the risks, benefits and potential complications related to surgery including the risks related to anesthesia, bleeding, infection, primary non-function of the allograft, the risk of reoperation as well as the risk of death.  We discussed the typical post-operative course, length of hospitalization, the long-term use of immunosuppressive therapy as well as the need for long-term routine follow-up.    Coronavirus disease (COVID-19) caused by severe acute respiratory virus coronavirus 2 (SARS-C0V 2) is associated with increased mortality in solid organ transplant recipients (SOT) compared to non-transplant patients. Vaccine responses to vaccination are depressed against SARS-CoV2 compared to normal individuals but improve with third vaccination doses. Vaccination prior to SOT provides both the best opportunity for transplant candidates to develop protective immunity and to reduce the risk of serious COVID19 infections post transplantation. Organ transplant candidates at [facility] will be required to receive SARS-CoV-2 vaccination prior to being listed with a an active status, whenever possible. Exceptions will be made for disability related reasons or for sincerely held Caodaism beliefs.     PHS: I discussed the use of organs from donors with PHS risk criteria, including the testing protocols utilized, as well as data from the  literature regarding the likelihood of transmission of hepatitis or HIV.  The patient is willing to consider such grafts.  DCD: I discussed the use of organs recovered by donation after cardiac death (DCD), including slightly decreased graft survival and greater risk of arterial and biliary complications. The potential advantage to the recipient is possibly receiving a transplant sooner by accepting such an organ. The patient is willing to consider such grafts.  HBcAb: I discussed the use of organs from donors with HBcAb in conjunction with long term use of HBV antiviral drugs, such as lamivudine. The small but measurable risk of hepatitis B seroconversion was discussed as well as the potentially life long need to continue antiviral drugs. The patient is willing to consider such grafts.  HCV Non-viremic recipient: I discussed the use of HCV-positive organs in naive recipients, including the risk of viral transmission to the patients or others, potential insurance barriers for antiviral medication coverage, risk for fibrosing cholestatic hepatitis, death or graft loss. The potential advantage to the recipient is the possibility of receiving a transplant sooner with decreased mortality risk by accepting such an organ. The patient is willing to consider such grafts.  LDLT: I discussed the nature of living donor liver transplant, including donor risks and more frequent recipient complications. The patient is willing to consider such grafts.  Covid: I discussed that this donor has tested positive for the covid virus, but with very low levels of virus and no evidence of covid disease. Although the risk of transmission is unknown, we believe this donor is not infectious and use of the abdominal organs is safe.  To date, these organs have been used with no evidence of transmission. The patient is willing to consider such grafts.

## 2022-05-23 NOTE — PLAN OF CARE
Pt arrived to IR/MPU room 2 for para, no acute distress noted. Orders and labs reviewed on chart. Awaiting consent.

## 2022-05-23 NOTE — PROGRESS NOTES
"SW met with the patient at the bedside in preporation of liver transplant SW evaluation. SW explained to patient that the team would prefer to have a caregiver present. Pt gave permission to SW to contact his sister to discuss. Patient states his sister Linda, brother Adams, and friend (ex-wife) Nayana is involved in his care. Patient states to call Linda first.     Linda Barragan (sister) - [phone]  Adams Munoz (brother) - [phone]  Nayana Cifuentes (friend) - [phone]    SW called pt's sister Linda to discuss caregiver requirements and need for caregiver present at hospital. Linda states that she can not present tomorrow as she "does not drive in [place]." She states her  and brother Adams both have work tomorrow.     JACE explained briefly the requirements for a suitable caregiver plan for liver transplant patients and Linda expressed understanding stating she and her brother Adams would be the caregivers for the patient. JACE explained that prior to listing, a caregiver would need to present in person to discuss the caregiver plan and expectations. Sister expressed understanding. No other needs identified at this time. SW remains available.   "

## 2022-05-23 NOTE — PLAN OF CARE
Para completed, pt tolerated well. No apparent distress noted. 8.7 Liters removed from right, mepore applied CDI. Labs collected and sent. Albumin 250 ml given per protocol. Report called to MAURICE Wu. Pt awaiting transport.

## 2022-05-23 NOTE — PLAN OF CARE
56 year-old male admitted 5/17 for Hepatology work-up. Pt has hx of ETOH cirrhosis., HTN, Hep B, Hep C, multiple bhargavi  -AAOx4, ambulates independently  -20 G Rt AC  -Lactulose TID  -Daily Tenofovir for Hep B  -Last Para today, 8.7 L removed  -2 gm Na diet  -CT chest complete  -ID consult placed  -pt declines any pain or discomfort  -pt lying in bed, bed in lowest position, wheels locked, non-skid socks in place, call light within reach

## 2022-05-23 NOTE — PROCEDURES
Radiology Post-Procedure Note    Pre Op Diagnosis: Ascites  Post Op Diagnosis: Same    Procedure: Ultrasound Guided Paracentesis    Procedure performed by: Yazmin HENDRICKS, Myla     Written Informed Consent Obtained: Yes  Specimen Removed: YES estefania fluid  Estimated Blood Loss: Minimal    Findings:   Successful paracentesis.  Albumin administered PRN per protocol.    Patient tolerated procedure well.    Myla Arce, APRN, FNP  Interventional Radiology  [phone] clinic

## 2022-05-24 LAB
ALBUMIN SERPL BCP-MCNC: 3.1 G/DL (ref 3.5–5.2)
ALP SERPL-CCNC: 131 U/L (ref 55–135)
ALT SERPL W/O P-5'-P-CCNC: 32 U/L (ref 10–44)
ANION GAP SERPL CALC-SCNC: 6 MMOL/L (ref 8–16)
AST SERPL-CCNC: 56 U/L (ref 10–40)
BACTERIA SPEC ANAEROBE CULT: NORMAL
BASOPHILS # BLD AUTO: 0.08 K/UL (ref 0–0.2)
BASOPHILS NFR BLD: 0.8 % (ref 0–1.9)
BILIRUB SERPL-MCNC: 2.4 MG/DL (ref 0.1–1)
BUN SERPL-MCNC: 19 MG/DL (ref 6–20)
CALCIUM SERPL-MCNC: 9 MG/DL (ref 8.7–10.5)
CHLORIDE SERPL-SCNC: 99 MMOL/L (ref 95–110)
CO2 SERPL-SCNC: 26 MMOL/L (ref 23–29)
CREAT SERPL-MCNC: 0.8 MG/DL (ref 0.5–1.4)
DIFFERENTIAL METHOD: ABNORMAL
EOSINOPHIL # BLD AUTO: 0.2 K/UL (ref 0–0.5)
EOSINOPHIL NFR BLD: 2.5 % (ref 0–8)
ERYTHROCYTE [DISTWIDTH] IN BLOOD BY AUTOMATED COUNT: 13.8 % (ref 11.5–14.5)
EST. GFR  (AFRICAN AMERICAN): >60 ML/MIN/1.73 M^2
EST. GFR  (NON AFRICAN AMERICAN): >60 ML/MIN/1.73 M^2
GLUCOSE SERPL-MCNC: 105 MG/DL (ref 70–110)
HCT VFR BLD AUTO: 36.8 % (ref 40–54)
HGB BLD-MCNC: 12.4 G/DL (ref 14–18)
IMM GRANULOCYTES # BLD AUTO: 0.07 K/UL (ref 0–0.04)
IMM GRANULOCYTES NFR BLD AUTO: 0.7 % (ref 0–0.5)
INR PPP: 1.2 (ref 0.8–1.2)
LYMPHOCYTES # BLD AUTO: 0.9 K/UL (ref 1–4.8)
LYMPHOCYTES NFR BLD: 9.4 % (ref 18–48)
MCH RBC QN AUTO: 29.2 PG (ref 27–31)
MCHC RBC AUTO-ENTMCNC: 33.7 G/DL (ref 32–36)
MCV RBC AUTO: 87 FL (ref 82–98)
MONOCYTES # BLD AUTO: 1.2 K/UL (ref 0.3–1)
MONOCYTES NFR BLD: 13.1 % (ref 4–15)
NEUTROPHILS # BLD AUTO: 7 K/UL (ref 1.8–7.7)
NEUTROPHILS NFR BLD: 73.5 % (ref 38–73)
NRBC BLD-RTO: 0 /100 WBC
PLATELET # BLD AUTO: 83 K/UL (ref 150–450)
PMV BLD AUTO: 10.6 FL (ref 9.2–12.9)
POTASSIUM SERPL-SCNC: 4.2 MMOL/L (ref 3.5–5.1)
PROT SERPL-MCNC: 5.8 G/DL (ref 6–8.4)
PROTHROMBIN TIME: 12 SEC (ref 9–12.5)
RBC # BLD AUTO: 4.24 M/UL (ref 4.6–6.2)
SODIUM SERPL-SCNC: 131 MMOL/L (ref 136–145)
WBC # BLD AUTO: 9.47 K/UL (ref 3.9–12.7)

## 2022-05-24 PROCEDURE — 99233 PR SUBSEQUENT HOSPITAL CARE,LEVL III: ICD-10-PCS | Mod: ,,, | Performed by: STUDENT IN AN ORGANIZED HEALTH CARE EDUCATION/TRAINING PROGRAM

## 2022-05-24 PROCEDURE — 25000003 PHARM REV CODE 250: Mod: NTX | Performed by: HOSPITALIST

## 2022-05-24 PROCEDURE — 85025 COMPLETE CBC W/AUTO DIFF WBC: CPT | Performed by: HOSPITALIST

## 2022-05-24 PROCEDURE — 87340 HEPATITIS B SURFACE AG IA: CPT | Performed by: INTERNAL MEDICINE

## 2022-05-24 PROCEDURE — 25000003 PHARM REV CODE 250: Performed by: STUDENT IN AN ORGANIZED HEALTH CARE EDUCATION/TRAINING PROGRAM

## 2022-05-24 PROCEDURE — 86706 HEP B SURFACE ANTIBODY: CPT | Performed by: INTERNAL MEDICINE

## 2022-05-24 PROCEDURE — 94761 N-INVAS EAR/PLS OXIMETRY MLT: CPT | Mod: NTX

## 2022-05-24 PROCEDURE — 99233 SBSQ HOSP IP/OBS HIGH 50: CPT | Mod: ,,, | Performed by: STUDENT IN AN ORGANIZED HEALTH CARE EDUCATION/TRAINING PROGRAM

## 2022-05-24 PROCEDURE — 99232 PR SUBSEQUENT HOSPITAL CARE,LEVL II: ICD-10-PCS | Mod: NTX,,, | Performed by: HOSPITALIST

## 2022-05-24 PROCEDURE — 86803 HEPATITIS C AB TEST: CPT | Performed by: INTERNAL MEDICINE

## 2022-05-24 PROCEDURE — 20600001 HC STEP DOWN PRIVATE ROOM

## 2022-05-24 PROCEDURE — 85610 PROTHROMBIN TIME: CPT | Performed by: STUDENT IN AN ORGANIZED HEALTH CARE EDUCATION/TRAINING PROGRAM

## 2022-05-24 PROCEDURE — 99232 SBSQ HOSP IP/OBS MODERATE 35: CPT | Mod: NTX,,, | Performed by: HOSPITALIST

## 2022-05-24 PROCEDURE — 86704 HEP B CORE ANTIBODY TOTAL: CPT | Performed by: INTERNAL MEDICINE

## 2022-05-24 PROCEDURE — 25000003 PHARM REV CODE 250: Mod: NTX

## 2022-05-24 PROCEDURE — 63600175 PHARM REV CODE 636 W HCPCS: Performed by: HOSPITALIST

## 2022-05-24 PROCEDURE — 99223 PR INITIAL HOSPITAL CARE,LEVL III: ICD-10-PCS | Mod: ,,, | Performed by: INTERNAL MEDICINE

## 2022-05-24 PROCEDURE — 36415 COLL VENOUS BLD VENIPUNCTURE: CPT | Performed by: INTERNAL MEDICINE

## 2022-05-24 PROCEDURE — 86382 NEUTRALIZATION TEST VIRAL: CPT | Performed by: INTERNAL MEDICINE

## 2022-05-24 PROCEDURE — 99223 1ST HOSP IP/OBS HIGH 75: CPT | Mod: ,,, | Performed by: INTERNAL MEDICINE

## 2022-05-24 PROCEDURE — 86790 VIRUS ANTIBODY NOS: CPT | Performed by: INTERNAL MEDICINE

## 2022-05-24 PROCEDURE — 25000003 PHARM REV CODE 250: Mod: NTX | Performed by: STUDENT IN AN ORGANIZED HEALTH CARE EDUCATION/TRAINING PROGRAM

## 2022-05-24 PROCEDURE — 80053 COMPREHEN METABOLIC PANEL: CPT | Performed by: HOSPITALIST

## 2022-05-24 RX ORDER — FUROSEMIDE 40 MG/1
40 TABLET ORAL 2 TIMES DAILY
Status: DISCONTINUED | OUTPATIENT
Start: 2022-05-24 | End: 2022-05-25 | Stop reason: HOSPADM

## 2022-05-24 RX ORDER — SPIRONOLACTONE 50 MG/1
150 TABLET, FILM COATED ORAL DAILY
Status: DISCONTINUED | OUTPATIENT
Start: 2022-05-25 | End: 2022-05-25 | Stop reason: HOSPADM

## 2022-05-24 RX ADMIN — TENOFOVIR DISPROXIL FUMARATE 300 MG: 300 TABLET ORAL at 08:05

## 2022-05-24 RX ADMIN — LACTULOSE 30 G: 20 SOLUTION ORAL at 09:05

## 2022-05-24 RX ADMIN — LACTULOSE 30 G: 20 SOLUTION ORAL at 08:05

## 2022-05-24 RX ADMIN — FUROSEMIDE 40 MG: 40 TABLET ORAL at 08:05

## 2022-05-24 RX ADMIN — RIFAXIMIN 550 MG: 550 TABLET ORAL at 08:05

## 2022-05-24 RX ADMIN — CYCLOBENZAPRINE HYDROCHLORIDE 10 MG: 5 TABLET, FILM COATED ORAL at 05:05

## 2022-05-24 RX ADMIN — ENOXAPARIN SODIUM 40 MG: 100 INJECTION SUBCUTANEOUS at 05:05

## 2022-05-24 RX ADMIN — RIFAXIMIN 550 MG: 550 TABLET ORAL at 09:05

## 2022-05-24 RX ADMIN — SIMETHICONE 80 MG: 80 TABLET, CHEWABLE ORAL at 02:05

## 2022-05-24 RX ADMIN — FUROSEMIDE 40 MG: 40 TABLET ORAL at 05:05

## 2022-05-24 RX ADMIN — PANTOPRAZOLE SODIUM 40 MG: 40 TABLET, DELAYED RELEASE ORAL at 08:05

## 2022-05-24 RX ADMIN — SPIRONOLACTONE 100 MG: 50 TABLET ORAL at 08:05

## 2022-05-24 NOTE — PROGRESS NOTES
"Transplant Recipient Adult Psychosocial Note - Inpatient     SW attempted to complete psychosocial assessment with the patient. Hepatology team and Dr. Burden had just met with the patient at the bedside, and pt was upset with the information he had just received. Patient observed sitting on the side of the bed and in tears. Patient states that he was informed that the team wants his labs to improve before they can move forward with any procedures.     I was able to gather some information from the patient regarding his social situations. Patient reports that he is lives alone in [place], [place]. He states that his step sister stays with him sometimes, but she does not provide care for him. 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 Patient states his current income is $800 a month that he receives from social security disability.     I informed patient that I spoke with his sister Linda yesterday regarding caregiver plan for transplant. Patient's sister unable to be present today as she does not drive in [place], per conversation yesterday. Myself and the patient discussed how his siblings work, however his sister states that her and his brother Adams can provide whatever care he needs regarding transplant. SW provided limited details regarding the caregiver plan requirements for transplant. SW did inform patient that he would need a caregiver with him for 6-8 weeks here locally following his transplant.     SW also inquired about patient's substance use history. Patient states "I'm an addict, there's nothing else I can say about that." Patient states he last used crystal meth "about a month ago". Patient was unable to recall when he first started using crystal meth but endorses consistent use for several years. Patient states he last drink 3-4 years " ago and he quit on his own. Patient has completed rehab programs in the past that were followed by relapses. Patient completed an inpatient program 5 years ago and following the program he continued to drink for another 1-1.5 years after.    SW provided patient emotional support and listened to his concerns. SW asked patient to interpret the news that the hepatologist just provided to him in his words. SW encouraged patient to focus on his health and recovery at this time and to not allow this information to overwhelm him.    Patient states he would like for his medical information to be shared with his sister as she takes care of him. SW discussed with Dr. Guerrero in the hallway and requested that she calls the patient sister and have her on the phone when she meets with him.     No other needs identified at this time. Information to be shared with transplant team. SW will reattempt to complete full evaluation, if deemed necessary. SW remains available.

## 2022-05-24 NOTE — PROGRESS NOTES
Hepatology Treatment Plan    Nic Munoz is a 56 y.o. male admitted to hospital 5/17/2022 (Hospital Day: 8) due to Alcoholic cirrhosis of liver with ascites.     Interval History  S/p 8.7L paracentesis with no evidence of SBP.  Social work seeing patient today.    Objective  Temp:  [97.8 °F (36.6 °C)-98.9 °F (37.2 °C)] 98.1 °F (36.7 °C) (05/24 0843)  Pulse:  [] 95 (05/24 0915)  BP: (119-134)/(72-86) 124/86 (05/24 0915)  Resp:  [15-25] 22 (05/24 0915)  SpO2:  [93 %-98 %] 97 % (05/24 0915)    General: Alert, Oriented x3, no distress  Neurologic: Asterixis absent  Abdomen: Normoactive bowel sounds. Non-distended. Normal tympany. Soft. Distended. Non-tender. No peritoneal signs.    Laboratory    Lab Results   Component Value Date    WBC 9.47 05/24/2022    HGB 12.4 (L) 05/24/2022    HCT 36.8 (L) 05/24/2022    MCV 87 05/24/2022    PLT 83 (L) 05/24/2022       Lab Results   Component Value Date     (L) 05/24/2022    K 4.2 05/24/2022    CL 99 05/24/2022    CO2 26 05/24/2022    BUN 19 05/24/2022    CREATININE 0.8 05/24/2022    CALCIUM 9.0 05/24/2022       Lab Results   Component Value Date    ALBUMIN 3.1 (L) 05/24/2022    ALT 32 05/24/2022    AST 56 (H) 05/24/2022    GGT 67 (H) 05/17/2022    ALKPHOS 131 05/24/2022    BILITOT 2.4 (H) 05/24/2022       Lab Results   Component Value Date    INR 1.2 05/24/2022    INR 1.2 05/23/2022    INR 1.2 05/22/2022       MELD-Na score: 18 at 5/24/2022  7:13 AM  MELD score: 12 at 5/24/2022  7:13 AM  Calculated from:  Serum Creatinine: 0.8 mg/dL (Using min of 1 mg/dL) at 5/24/2022  7:13 AM  Serum Sodium: 131 mmol/L at 5/24/2022  7:13 AM  Total Bilirubin: 2.4 mg/dL at 5/24/2022  7:13 AM  INR(ratio): 1.2 at 5/24/2022  7:13 AM  Age: 56 years    Plan  - Discussed with patient that given his MELD score TIPS would likely pose a significant risk of worsening liver failure and that backup by transplant would likely be only way to go forward.  Will discuss patient's case at transplant  committee meeting today.  Possible barriers to transplant include h/o EtOH use, IVDU, lack of social support  - please obtain daily CBC, BMP, LFT, INR  - Plan of care was discussed with primary team    Thank you for involving us in the care of Nic Munoz. Please call with any additional concerns or questions.    Joby Jasso MD  Gastroenterology Fellow

## 2022-05-24 NOTE — CONSULTS
Pre Transplant Infectious Diseases Consult  Liver Transplant Recipient Evaluation    Requesting Physician: Zayda Guerrero MD    Reason for Visit:  Pre-transplant evaluation    History of Present Illness  Nic Munoz 57 y/o M hx of alcoholic cirrhosis, HTN, hepatitis B (TDF).    Transfer to [facility] [place] liver transplant eval from [place], [place]. Long history of cirrhosis with regular large-volume paracenteses for the past 10 months with increasing in frequency. Abdominal discomfort improved post-para, reports significant LE swelling. SP multiple paracenteses (5/17, 5/19, 5/23 WBC trend 292, 250, 338 and associated ascitic fluid cxs negative -- findings not suggestive of SBP).    History of infections:  Recent infections: no  Recurrent infections (sinusitis / pneumonia / UTI): no  STI: no  COVID-19: no    Admission for infection: no  Need for intravenous antibiotics: no    Chicken Pox: as a child       Shingles: no    History of Immunosuppression:  Prior chemotherapy / immunosuppression: no  Prior transplant: no  History of splenectomy: no           Tuberculosis             Prior screening for latent TB: yes  Prior treatment for latent TB: yes as per patient, 15 years ago roughly  Exposure to person with active TB: no    Geographical Exposures  Lived in the following states:  Lived in [facility]: no  No travel abroad                 Job History  Worked as a perez, 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   Routine  childhood vaccines     COVID-19 vaccine      Influenza vaccine    Hepatitis A vaccine series           Hepatitis B vaccine series    HPV vaccine series    Prevnar         Pneumovax   Shingles vaccine        Tetanus-diptheria-pertussis            Significant Labs Reviewed:    HIV 1/2 Ag/Ab   Date Value Ref Range Status   05/17/2022 Negative Negative Final     TB Gold Plus   Date Value Ref Range Status   05/19/2022 Negative Negative Final     Comment:     M. tuberculosis infection NOT likely.     RPR   Date Value Ref Range Status   05/19/2022 Non-reactive Non-reactive Final     Strongyloides Ab IgG   Date Value Ref Range Status   05/19/2022 Positive (A) Negative Final     Comment:     IgG antibodies to Strongyloides were detected suggesting  current or past infection. False positive results may  occur with other roundworm infections (e.g., Trichinella,  Taenia solium).  Clinical correlation is required.    Test Performed by:  [facility]  [address] [id number]  : Diego Tierney M.D. Ph.D.; CLIA# [id number]       Varicella Interpretation   Date Value Ref Range Status   05/19/2022 Positive (A) Negative Final     Comment:     <or=0.8    Negative        No detectable IgG antibody to Varicella zoster  by the AMAYA test. Such individuals are presumed to be   uninfected with Varicella zoster and to be susceptible to   primary infection.    0.9-1.0    Equivocal    >or=1.1    Positive        Indicates presence of detectable IgG antibody to Varicella   zoster by the AMAYA test. Indicative of previous or current   infection.          Pending Labs:    HepA Ab  HepBc Ab  HepBs Ag  HepBs Ab  HepC Ab    Assessment     - Organ transplant candidate  - Vaccine counseling    Plan:     Transplant Infectious Disease - Candidacy:   Based on available information, there are no identified significant barriers to transplantation from an infectious disease standpoint  pending acceptable serologies.    -  Final determination of transplant candidacy will be made once evaluation is complete and reviewed by the Transplant Selection Committee.      Counseling:  I discussed with the patient the risk for increased susceptibility to infections following transplantation including increased risk for infection right after transplant and if rejection should occur.      Specific guidance has been provided to the patient regarding the patients occupation, hobbies and activities to avoid future infectious complications including but not limited to avoiding undercooked meats and seafood, proper hygiene, and contact with animals.    The patients has been counseled on the importance of vaccinations including but not limited to a yearly flu vaccine.    Immunizations:  Based on the patients immunization history and serologies, immunizations were ordered:    - COVID-19  - Influenza vaccine    - Prevnar  - Pneumovax 2 months  - Shingrix 0, 2 months   - TDaP    - Hepatitis A 0, 6 months  - Heplisav (Hepatitis B) 0, 1 months       The patient was encouraged to contact us about any problems that may develop after immunization and possible side effects were reviewed.     James Lamb MD  [facility], PGY-4  Cell: [phone]    - Strongy IgG positive -- give ivermectin once daily for 2 days, followed by repeat once daily dosing for 2 days in 2 weeks -- if Lxp is considered    Today:  - Influenza  - Hepatitis A #1  - Hepatitis B #1  - Prevnar (Pneumonia #1)  - TDaP (good for 10 years)  - Shingrix #1 (Shingles)    1 month:  - Hepatitis B #2    2 months:  - Pneumovax (Pneumonia #2)  - Shingrix #2 (Shingles)    6 months:  - Hepatitis A #2

## 2022-05-24 NOTE — PLAN OF CARE
56 year-old male admitted 5/17 for Hepatology work-up. Pt has hx of ETOH cirrhosis., HTN, Hep B, Hep C, multiple bhargavi  -AAOx4, ambulates independently  -20 G Lt FA  -Lactulose TID, pt refused second dose this shift  -Daily Tenofovir for Hep B  -2 gm Na diet  -abdomen distended  -pt declines any pain or discomfort  -team to have transplant committee meeting  -pt lying in bed, bed in lowest position, wheels locked, non-skid socks in place, call light within reach

## 2022-05-24 NOTE — CONSULTS
[facility] - Transplant Stepdown  Adult Nutrition  Consult Note    Liver Transplant Evaluation    SUMMARY     Recommendations    1. Continue Low Na, 2g diet    2. If PO < 50%, add Boost Plus BID    3. RD to monitor and follow     Goals: Meet % EEN, EPN by RD f/u date  Nutrition Goal Status: new  Communication of RD Recs:  (POC)    Assessment and Plan    Nutrition Problem  Increased nutrient needs (protein, energy)    Related to (etiology):   Increased metabolic, physiological demands    Signs and Symptoms (as evidenced by):   Liver cirrhosis    Interventions/Recommendations (treatment strategy):  Collaboration with other providers  Nutrition education    Nutrition Diagnosis Status:   New     Malnutrition Assessment  Orbital Region (Subcutaneous Fat Loss): mild depletion  Upper Arm Region (Subcutaneous Fat Loss): mild depletion   [place] Region (Muscle Loss): well nourished  Clavicle Bone Region (Muscle Loss): well nourished  Clavicle and Acromion Bone Region (Muscle Loss): well nourished  Dorsal Hand (Muscle Loss): well nourished  Patellar Region (Muscle Loss): well nourished  Anterior Thigh Region (Muscle Loss): well nourished     Reason for Assessment    Reason For Assessment: consult - Liver Transplant Evaluation  Diagnosis: liver disease  Relevant Medical History: alcoholic liver cirrhosis, HTN, hepatitis B, COPD  Interdisciplinary Rounds: did not attend    General Information Comments:   RD consulted for pre-transplant evaluation. S/p paracentesis (5/23). Large volume paracenteses for the past 10 months.   Pt reports good appetite, tolerating 100% of meals. Follow a low Na diet PTA.  lb, indicated a wt gained of 15lb (8%), unsure of accuracy. 2+ edema to bilateral leg. Pt report difficulties to chew hard food, tolerating softer food. Denies swallowing difficulties, N/V. NFPE completed today, noted mild fat depletion. Pt do not meet ASPEN criteria for malnutrition at this time.   Educated pt on low  sodium diet with handout. Discussed the importance of limit sodium intake. Pt verbalize understanding, no additional question.     Nutrition Discharge Planningm Na education provided on (). Discussed foods to limit or avoid and benefits of diet adherence. Post transplant expectations/guidelines also introduced. Appropriate education material, RD contact information provided. No other needs identified.    Nutrition Risk Screen    Nutrition Risk Screen: no indicators present    Anthropometrics    Temp: 98.3 °F (36.8 °C)  Height Method: Stated  Height: 6' (182.9 cm)  Height (inches): 72 in  Weight Method: Stated  Weight: 90.7 kg (200 lb)  Weight (lb): 200 lb  Ideal Body Weight (IBW), Male: 178 lb  % Ideal Body Weight, Male (lb): 112.36 %  BMI (Calculated): 27.1     Lab/Procedures/Meds    Pertinent Labs Reviewed: reviewed  Pertinent Labs Comments: Na 131, AST 56    Pertinent Medications Reviewed: reviewed  Pertinent Medications Comments: spironolactone, furosemide, lactulose, pantoprazole    Estimated/Assessed Needs    Weight Used For Calorie Calculations: 90.7 kg (199 lb 15.3 oz)  Energy Calorie Requirements (kcal):   Energy Need Method: [place]-St Jeor (PAL 1.2)     Protein Requirements: 100-118g (1.1-1.3g/kg)  Weight Used For Protein Calculations: 90.7 kg (199 lb 15.3 oz)  Fluid Requirements (mL): per MD     Estimated Fluid Requirement Method: RDA Method  RDA Method (mL):      Nutrition Prescription Ordered    Current Diet Order: Low Na, 2g Diet    Evaluation of Received Nutrient/Fluid Intake    I/O: - 10L since admit  Energy Calories Required: meeting needs  Protein Required: meeting needs  Comments: LBM   Tolerance: tolerating  % Intake of Estimated Energy Needs: 100%  % Meal Intake: 100%    Nutrition Risk    Level of Risk/Frequency of Follow-up: low     Monitor and Evaluation    Food and Nutrient Intake: energy intake, food and beverage intake  Food and Nutrient Adminstration: diet  order  Knowledge/Beliefs/Attitudes: food and nutrition knowledge/skill, beliefs and attitudes  Physical Activity and Function: nutrition-related ADLs and IADLs  Anthropometric Measurements: height/length, weight, weight change, body mass index  Biochemical Data, Medical Tests and Procedures: electrolyte and renal panel, gastrointestinal profile, glucose/endocrine profile, inflammatory profile, lipid profile  Nutrition-Focused Physical Findings: overall appearance     Nutrition Follow-Up    RD Follow-up?: Yes     Boogie SCOTT

## 2022-05-24 NOTE — PROGRESS NOTES
Progress Note  Hospital Medicine      Patient Name: Nic Munoz  MRN: [id number]  Patient Class: IP         Admission Date: 5/17/2022  Attending Physician: Zayda Guerrero   Primary Care Provider: Primary Doctor No    SUBJECTIVE:     Follow-up For:  Alcoholic cirrhosis of liver with ascites     Interval history/ROS:  5/24: s/p paracentesis yesterday 9L     5/23: transplant eval, paracentesis    5/22: no acute issues      5/21: resting comfortably    5/20: DSE today.     5/19: at paracentesis today during  Rounds, DSE for AM.     HPI:  55 y/o M hx of alcoholic cirrhosis, HTN, hepatitis B on antiviral therapy presents as a transfer to [facility] for hepatology eval for possible transplant. Pt comes from [place], [place]. Pt reports long history of cirrhosis; he states he has been receiving regular large-volume paracenteses for the past 10 months and he says they have been steadily increasing in frequency. He was referred last week to [facility] transplant team for a TIPS EVAL. He was evaluated in clinic day of admission with Dr. Yan and was deemed not a candidate for TIPS currently given MELD of 20. He underwent a paracentesis and then was directed to the [facility] ED for admission to the hospital for transplant workup. On interview pt reports feeling significantly better post-para. He denies any current acute complaints. He denies fever, chills, nausea, vomiting. Abdominal pain improved post-para. He does report significant LE swelling.      In the ED, Pt HDS, afebrile, /74, HR 70s. CBC without leukocytosis, mild anemia with hgb 13.2, Plt 132. CMP notable for Na 131, Cr elevated to 1.7 (from baseline 1.0). Ammonia 70. INR 1.3. paracentesis labs pending at time of admission.         Overview/Hospital Course:  Patient admitted for management of BC, decompensated cirrhosis, and hepatology eval. Pt given lactulose and ctx for SBP prophylaxis. Midodrine, octreotide, albumin given for likely HRS. Patient continued on home  Tenofovir for HBV. Hepatology consulted on admission. The following day patient's Cr improving. CTAP demonstrating nonobstructive renal calculi, no hydronephrosis. Paracentesis labs not c/w SBP, so Ctx discontinued. MELD improving. Will f/u hepatology recs. Pt taken off HRS protocol on 5/18 per hepatology. Hepatology will start tx workup. Anticipate transfer to IML team when space available.        OBJECTIVE:     Body mass index is 27.12 kg/m².    Vital Signs Range (Last 24H):  Temp:  [97.8 °F (36.6 °C)-98.9 °F (37.2 °C)]   Pulse:  []   Resp:  [15-25]   BP: (119-134)/(72-86)   SpO2:  [93 %-98 %]     I & O (Last 24H):  No intake or output data in the 24 hours ending 05/24/22 0945     Physical Exam:  Vitals and nursing note reviewed.   Constitutional:       General: He is not in acute distress.     Appearance: He is ill-appearing. He is not toxic-appearing or diaphoretic.      Comments: Pleasant, ill-appearing male lying in bed in NAD.   HENT:      Head: Normocephalic and atraumatic.      Nose: Nose normal. No congestion.      Mouth/Throat:      Mouth: Mucous membranes are dry.      Pharynx: Oropharynx is clear.      Comments: Poor dentition  Eyes:      General: Scleral icterus present.      Extraocular Movements: Extraocular movements intact.      Pupils: Pupils are equal, round, and reactive to light.   Cardiovascular:      Rate and Rhythm: Normal rate and regular rhythm.      Pulses: Normal pulses.      Heart sounds: Normal heart sounds. No murmur heard.    No friction rub. No gallop.   Pulmonary:      Effort: Pulmonary effort is normal.      Breath sounds: Normal breath sounds.   Abdominal:      Comments: Abdomen distended. Nontender to palpation. Normoactive bowel sounds.    Musculoskeletal:      Cervical back: Normal range of motion and neck supple.      Right lower leg: Edema (3+ pitting) present.      Left lower leg: Edema (3+ pitting) present.   Skin:     General: Skin is warm and dry.      Coloration:  Skin is jaundiced.   Neurological:      General: No focal deficit present.      Mental Status: He is alert and oriented to person, place, and time.      Comments: No asterixis or tremors noted   Psychiatric:         Mood and Affect: Mood normal.         Behavior: Behavior normal.        Recent Labs   Lab 05/22/22  0309 05/23/22  0541 05/24/22  0713   * 132* 131*   K 4.4 4.2 4.2   CL 99 99 99   CO2 26 27 26   BUN 25* 24* 19   CREATININE 1.1 1.1 0.8   GLU 91 97 105   CALCIUM 9.1 8.8 9.0     Recent Labs   Lab 05/22/22  0309 05/23/22  0541 05/24/22  0713   ALKPHOS 140* 149* 131   ALT 29 29 32   AST 47* 50* 56*   ALBUMIN 3.0* 2.8* 3.1*   PROT 6.3 6.1 5.8*   BILITOT 2.6* 2.4* 2.4*   INR 1.2 1.2 1.2       Recent Labs   Lab 05/17/22  1117 05/17/22  1519 05/19/22  1201 05/20/22  0230 05/22/22  0309 05/23/22  0541 05/23/22  1144 05/24/22  0713   WBC  --    < >  --    < > 10.35 11.60  --  9.47   HGB  --    < >  --    < > 14.2 13.7*  --  12.4*   HCT  --    < >  --    < > 42.1 39.9*  --  36.8*   PLT  --    < >  --    < > 85* 94*  --  83*   GRAN  --    < >  --    < > 72.6  7.5 71.3  8.3*  --  73.5*  7.0   SEGS 30  --  14  --   --   --  36  --    LYMPH  --    < >  --    < > 11.3*  1.2 10.9*  1.3  --  9.4*  0.9*   LYMPHS 61  --  78  --   --   --  52  --    MONO  --    < >  --    < > 13.9  1.4* 15.2*  1.8*  --  13.1  1.2*    < > = values in this interval not displayed.       No results for input(s): POCTGLUCOSE in the last 168 hours.    No results for input(s): TROPONINI in the last 168 hours.    Diagnostic Results:  Labs: Reviewed    enoxaparin, 40 mg, Subcutaneous, Daily  fluticasone furoate-vilanteroL, 1 puff, Inhalation, Daily  furosemide, 40 mg, Oral, Daily  lactulose, 30 g, Oral, TID  pantoprazole, 40 mg, Oral, Daily  rifAXImin, 550 mg, Oral, BID  spironolactone, 100 mg, Oral, Daily  tenofovir disoproxil fumarate, 300 mg, Oral, Daily  tiotropium bromide, 2 puff, Inhalation, Daily        As Needed albuterol,  cyclobenzaprine, dextrose 10%, dextrose 10%, glucagon (human recombinant), glucose, glucose, naloxone, simethicone, sodium chloride 0.9%    ASSESSMENT/PLAN:     Assessment: Nic Munoz is a 56 y.o. male here with:     Active Hospital Problems    Diagnosis  POA    *Alcoholic cirrhosis of liver with ascites [K70.31]  Yes    Amphetamine use disorder, severe [F15.20]  Yes     Chronic    Alcohol use disorder, severe, in sustained remission [F10.21]  Yes     Chronic    IVDU (intravenous drug user) [F19.90]  No    BC (acute kidney injury) [N17.9]  Unknown    Ureteral stent retained [Z96.0]  Not Applicable    Esophageal varices without bleeding [I85.00]  Unknown    COPD not affecting current episode of care [J44.9]  Unknown    HTN (hypertension) [I10]  Yes    Chronic hepatitis B with cirrhosis [B18.1, K74.60]  Yes      Resolved Hospital Problems   No resolved problems to display.        Plan:     Alcoholic cirrhosis of liver with ascites    MELD-Na score: 18 at 5/24/2022  7:13 AM  MELD score: 12 at 5/24/2022  7:13 AM  Calculated from:  Serum Creatinine: 0.8 mg/dL (Using min of 1 mg/dL) at 5/24/2022  7:13 AM  Serum Sodium: 131 mmol/L at 5/24/2022  7:13 AM  Total Bilirubin: 2.4 mg/dL at 5/24/2022  7:13 AM  INR(ratio): 1.2 at 5/24/2022  7:13 AM  Age: 56 years  55 y/o M hx of alcoholic cirrhosis, Hep B cirrhosis, HTN presents from hepatology clinic with decompensated cirrhosis and for tx eval. Pt underwent paracentesis in clinic with labs sent. Patient afebrile, HDS and in no distress on admission. Hepatology consulted, appreciate their input.      Pt to be transferred to L when space available per hepatology. Will continue tx eval and workup.      Plan:  -- current meld 17  -- hepatology consulted on admission  -- lactulose 30 TID; titrate to 3 BM per day  -- discontinuing HRS protocol per hepatology on 5/18  -- will resume low-dose lasix, spironolactone on 5/19 am  -- D/C ctx as low suspicion for SBP and para  studies negative for SBP  -- US liver w/ doppler ordered  -- daily CBC, CMP, INR        Esophageal varices without bleeding  Last EGD 2/2022 at OSH:  Grade I varices were found in the lower third of the esophagus. Scarring noted from prior banding. Moderate portal hypertensive gastropathy was found in the entire examined stomach. Diffuse moderately erythematous mucosa without active bleeding and with   no stigmata of bleeding was found in the duodenal bulb.   Was recommended for Inderal LA 80mg daily, Lasix 40 BID, Aldactone 100mg BID, and Protonix 40mg daily  F/u 3 months and US portal vein doppler (not in CareEverywhere)                  - resuming low-dose lasix and spironolactone on 5/19           Ureteral stent retained           BC (acute kidney injury)  Patient with acute kidney injury likely d/t Pre-renal azotemia . Labs reviewed- Renal function/electrolytes with Estimated Creatinine Clearance: 59.6 mL/min (A) (based on SCr of 1.7 mg/dL (H)). according to latest data. Monitor urine output and serial BMP and adjust therapy as needed. Avoid nephrotoxins and renally dose meds for GFR listed above.      Concern for HRS given current decompensated liver cirrrhosis vs volume depletion s/o poor PO intake, though high suspicion at this time for HRS. On 5/18 pt Cr improving to 1.3, ok to discontinue HRS protocol per hepatology.      -- resume low-dose lasix, spironolactone on 5/19 am  -- discontinue albumin, octreotide, midodrine  -- avoid nephrotoxic medications  -- renally dose meds  -- CT AP with nonobstructive renal calculi, no hydronephrosis        Chronic hepatitis B with cirrhosis  -- continue Tenofovir 300 mg qd           HTN (hypertension)  Patient reports history of HTN, not currently on any antihypertensive medications. Patient normotensive in ED. Softer pressures while admitted. Will continue to monitor.             HIGH RISK CONDITION(S):  Patient has a condition that poses threat to life and bodily  function: Acute Renal Failure          Zayda Guerrero MD

## 2022-05-24 NOTE — PLAN OF CARE
Patient AAO X 4, VSS. Denies pain, N/V  Skin intact. Para done yesterday removed 8.7L. CT of the chest showed small right pleural effusion. Up ambulatory, bed locked at lowest setting, yellow socks on, call bell in reach. Remains free from falls.

## 2022-05-25 VITALS
SYSTOLIC BLOOD PRESSURE: 130 MMHG | TEMPERATURE: 98 F | BODY MASS INDEX: 27.09 KG/M2 | WEIGHT: 200 LBS | OXYGEN SATURATION: 96 % | HEIGHT: 72 IN | RESPIRATION RATE: 19 BRPM | HEART RATE: 86 BPM | DIASTOLIC BLOOD PRESSURE: 74 MMHG

## 2022-05-25 LAB
ALBUMIN SERPL BCP-MCNC: 2.9 G/DL (ref 3.5–5.2)
ALP SERPL-CCNC: 162 U/L (ref 55–135)
ALT SERPL W/O P-5'-P-CCNC: 37 U/L (ref 10–44)
ANION GAP SERPL CALC-SCNC: 9 MMOL/L (ref 8–16)
AST SERPL-CCNC: 63 U/L (ref 10–40)
BASOPHILS # BLD AUTO: 0.11 K/UL (ref 0–0.2)
BASOPHILS NFR BLD: 0.8 % (ref 0–1.9)
BILIRUB SERPL-MCNC: 1.5 MG/DL (ref 0.1–1)
BUN SERPL-MCNC: 19 MG/DL (ref 6–20)
CALCIUM SERPL-MCNC: 8.9 MG/DL (ref 8.7–10.5)
CHLORIDE SERPL-SCNC: 100 MMOL/L (ref 95–110)
CO2 SERPL-SCNC: 24 MMOL/L (ref 23–29)
CREAT SERPL-MCNC: 1 MG/DL (ref 0.5–1.4)
DIFFERENTIAL METHOD: ABNORMAL
EOSINOPHIL # BLD AUTO: 0.3 K/UL (ref 0–0.5)
EOSINOPHIL NFR BLD: 2.5 % (ref 0–8)
ERYTHROCYTE [DISTWIDTH] IN BLOOD BY AUTOMATED COUNT: 14 % (ref 11.5–14.5)
EST. GFR  (AFRICAN AMERICAN): >60 ML/MIN/1.73 M^2
EST. GFR  (NON AFRICAN AMERICAN): >60 ML/MIN/1.73 M^2
GLUCOSE SERPL-MCNC: 105 MG/DL (ref 70–110)
HAV IGG SER QL IA: POSITIVE
HBSAG CONFIRMATION: POSITIVE
HBV CORE AB SERPL QL IA: POSITIVE
HBV SURFACE AB SER-ACNC: NEGATIVE M[IU]/ML
HBV SURFACE AG SERPL QL IA: POSITIVE
HCT VFR BLD AUTO: 38.5 % (ref 40–54)
HCV AB SERPL QL IA: NEGATIVE
HGB BLD-MCNC: 13 G/DL (ref 14–18)
IMM GRANULOCYTES # BLD AUTO: 0.22 K/UL (ref 0–0.04)
IMM GRANULOCYTES NFR BLD AUTO: 1.7 % (ref 0–0.5)
INR PPP: 1.1 (ref 0.8–1.2)
LYMPHOCYTES # BLD AUTO: 1.3 K/UL (ref 1–4.8)
LYMPHOCYTES NFR BLD: 10.1 % (ref 18–48)
MCH RBC QN AUTO: 29.3 PG (ref 27–31)
MCHC RBC AUTO-ENTMCNC: 33.8 G/DL (ref 32–36)
MCV RBC AUTO: 87 FL (ref 82–98)
MONOCYTES # BLD AUTO: 1.5 K/UL (ref 0.3–1)
MONOCYTES NFR BLD: 11.1 % (ref 4–15)
NEUTROPHILS # BLD AUTO: 9.6 K/UL (ref 1.8–7.7)
NEUTROPHILS NFR BLD: 73.8 % (ref 38–73)
NRBC BLD-RTO: 0 /100 WBC
PLATELET # BLD AUTO: 116 K/UL (ref 150–450)
PMV BLD AUTO: 10.7 FL (ref 9.2–12.9)
POTASSIUM SERPL-SCNC: 4.5 MMOL/L (ref 3.5–5.1)
PROT SERPL-MCNC: 6.1 G/DL (ref 6–8.4)
PROTHROMBIN TIME: 11.8 SEC (ref 9–12.5)
RBC # BLD AUTO: 4.43 M/UL (ref 4.6–6.2)
SODIUM SERPL-SCNC: 133 MMOL/L (ref 136–145)
WBC # BLD AUTO: 13.03 K/UL (ref 3.9–12.7)

## 2022-05-25 PROCEDURE — 94761 N-INVAS EAR/PLS OXIMETRY MLT: CPT

## 2022-05-25 PROCEDURE — 99239 HOSP IP/OBS DSCHRG MGMT >30: CPT | Mod: ,,, | Performed by: HOSPITALIST

## 2022-05-25 PROCEDURE — 25000242 PHARM REV CODE 250 ALT 637 W/ HCPCS: Performed by: HOSPITALIST

## 2022-05-25 PROCEDURE — 85025 COMPLETE CBC W/AUTO DIFF WBC: CPT | Performed by: HOSPITALIST

## 2022-05-25 PROCEDURE — 80053 COMPREHEN METABOLIC PANEL: CPT | Performed by: HOSPITALIST

## 2022-05-25 PROCEDURE — 25000003 PHARM REV CODE 250

## 2022-05-25 PROCEDURE — 25000003 PHARM REV CODE 250: Performed by: HOSPITALIST

## 2022-05-25 PROCEDURE — 25000003 PHARM REV CODE 250: Performed by: STUDENT IN AN ORGANIZED HEALTH CARE EDUCATION/TRAINING PROGRAM

## 2022-05-25 PROCEDURE — 94640 AIRWAY INHALATION TREATMENT: CPT

## 2022-05-25 PROCEDURE — 36415 COLL VENOUS BLD VENIPUNCTURE: CPT | Performed by: STUDENT IN AN ORGANIZED HEALTH CARE EDUCATION/TRAINING PROGRAM

## 2022-05-25 PROCEDURE — 99239 PR HOSPITAL DISCHARGE DAY,>30 MIN: ICD-10-PCS | Mod: ,,, | Performed by: HOSPITALIST

## 2022-05-25 PROCEDURE — 85610 PROTHROMBIN TIME: CPT | Performed by: STUDENT IN AN ORGANIZED HEALTH CARE EDUCATION/TRAINING PROGRAM

## 2022-05-25 RX ORDER — FUROSEMIDE 40 MG/1
40 TABLET ORAL 2 TIMES DAILY
Qty: 60 TABLET | Refills: 11 | Status: SHIPPED | OUTPATIENT
Start: 2022-05-25 | End: 2022-05-25 | Stop reason: SDUPTHER

## 2022-05-25 RX ORDER — SPIRONOLACTONE 100 MG/1
150 TABLET, FILM COATED ORAL DAILY
Qty: 45 TABLET | Refills: 11 | Status: SHIPPED | OUTPATIENT
Start: 2022-05-25 | End: 2022-05-25 | Stop reason: SDUPTHER

## 2022-05-25 RX ORDER — SPIRONOLACTONE 100 MG/1
150 TABLET, FILM COATED ORAL DAILY
Qty: 45 TABLET | Refills: 11 | Status: SHIPPED | OUTPATIENT
Start: 2022-05-25

## 2022-05-25 RX ORDER — SPIRONOLACTONE 100 MG/1
150 TABLET, FILM COATED ORAL DAILY
Qty: 45 TABLET | Refills: 11 | Status: SHIPPED | OUTPATIENT
Start: 2022-05-25 | End: 2022-05-25 | Stop reason: HOSPADM

## 2022-05-25 RX ORDER — FUROSEMIDE 40 MG/1
40 TABLET ORAL 2 TIMES DAILY
Qty: 60 TABLET | Refills: 11 | Status: SHIPPED | OUTPATIENT
Start: 2022-05-25

## 2022-05-25 RX ORDER — FUROSEMIDE 40 MG/1
40 TABLET ORAL 2 TIMES DAILY
Qty: 60 TABLET | Refills: 11 | Status: SHIPPED | OUTPATIENT
Start: 2022-05-25 | End: 2022-05-25 | Stop reason: HOSPADM

## 2022-05-25 RX ADMIN — LACTULOSE 30 G: 20 SOLUTION ORAL at 08:05

## 2022-05-25 RX ADMIN — RIFAXIMIN 550 MG: 550 TABLET ORAL at 08:05

## 2022-05-25 RX ADMIN — FUROSEMIDE 40 MG: 40 TABLET ORAL at 08:05

## 2022-05-25 RX ADMIN — FLUTICASONE FUROATE AND VILANTEROL TRIFENATATE 1 PUFF: 200; 25 POWDER RESPIRATORY (INHALATION) at 09:05

## 2022-05-25 RX ADMIN — TIOTROPIUM BROMIDE INHALATION SPRAY 2 PUFF: 3.12 SPRAY, METERED RESPIRATORY (INHALATION) at 09:05

## 2022-05-25 RX ADMIN — TENOFOVIR DISPROXIL FUMARATE 300 MG: 300 TABLET ORAL at 08:05

## 2022-05-25 RX ADMIN — PANTOPRAZOLE SODIUM 40 MG: 40 TABLET, DELAYED RELEASE ORAL at 08:05

## 2022-05-25 RX ADMIN — SPIRONOLACTONE 150 MG: 50 TABLET, FILM COATED ORAL at 08:05

## 2022-05-25 NOTE — PROGRESS NOTES
Hepatology Treatment Plan    Nic Munoz is a 56 y.o. male admitted to hospital 5/17/2022 (Hospital Day: 9) due to Alcoholic cirrhosis of liver with ascites.     Interval History  Declined for Liver transplant evaluation by transplant committee due to history of IVDU, lack of social support.    Objective  Temp:  [97.7 °F (36.5 °C)-98.3 °F (36.8 °C)] 97.8 °F (36.6 °C) (05/25 0843)  Pulse:  [104-112] 108 (05/25 0928)  BP: (110-142)/(71-78) 116/75 (05/25 0843)  Resp:  [18-24] 18 (05/25 0928)  SpO2:  [93 %-99 %] 94 % (05/25 0928)    Laboratory    Lab Results   Component Value Date    WBC 13.03 (H) 05/25/2022    HGB 13.0 (L) 05/25/2022    HCT 38.5 (L) 05/25/2022    MCV 87 05/25/2022     (L) 05/25/2022       Lab Results   Component Value Date     (L) 05/25/2022    K 4.5 05/25/2022     05/25/2022    CO2 24 05/25/2022    BUN 19 05/25/2022    CREATININE 1.0 05/25/2022    CALCIUM 8.9 05/25/2022       Lab Results   Component Value Date    ALBUMIN 2.9 (L) 05/25/2022    ALT 37 05/25/2022    AST 63 (H) 05/25/2022    GGT 67 (H) 05/17/2022    ALKPHOS 162 (H) 05/25/2022    BILITOT 1.5 (H) 05/25/2022       Lab Results   Component Value Date    INR 1.1 05/25/2022    INR 1.2 05/24/2022    INR 1.2 05/23/2022       MELD-Na score: 9 at 5/25/2022  7:14 AM  MELD score: 9 at 5/25/2022  7:14 AM  Calculated from:  Serum Creatinine: 1.0 mg/dL at 5/25/2022  7:14 AM  Serum Sodium: 133 mmol/L at 5/25/2022  7:14 AM  Total Bilirubin: 1.5 mg/dL at 5/25/2022  7:14 AM  INR(ratio): 1.1 at 5/25/2022  7:14 AM  Age: 56 years    Plan  - Declined for transplant evaluation.  Barriers to transplant include h/o EtOH use, IVDU, lack of social support  - Not a candidate for TIPS   - will need to follow up with outpatient gastroenterologist  - PRN paracentesis, likely weekly  - discharge on lasix 40BID, spironolactone 100mg BID  - Low Na diet  - please obtain daily CBC, BMP, LFT, INR  - Plan of care was discussed with primary team    Thank  you for involving us in the care of Nic FRANCISCO JAVIER Alexander. Please call with any additional concerns or questions.    Joby Jasso MD  Gastroenterology Fellow

## 2022-05-25 NOTE — PLAN OF CARE
Transportation has been arranged with [place] [employer] - non emergency transport/[phone].  ETA for transport is 11:00am.  Reference #[id number].    SW has notified bedside nurse (Temitope) of above.    Warner Vinson LMSW  [facility]  X [phone]

## 2022-05-25 NOTE — PROGRESS NOTES
AVS printout provided to pt and reviewed at bedside. Questions encouraged and answered accordingly. Pt verbalized understanding of AVS. VSS on bedside monitor, ViSi and PIV removed. Pt was escorted off unit via wheelchair by this RN, no apparent signs of distress noted. Remains free from falls/injuries.

## 2022-05-25 NOTE — PLAN OF CARE
[facility] - Transplant Stepdown  Discharge Final Note    Primary Care Provider: Primary Doctor No    Expected Discharge Date: 5/25/2022      Final Discharge Note (most recent)     Final Note - 05/25/22 1701        Final Note    Assessment Type Final Discharge Note     Anticipated Discharge Disposition Home or Self Care     What phone number can be called within the next 1-3 days to see how you are doing after discharge? [phone]     Hospital Resources/Appts/Education Provided Appointments scheduled and added to AVS        Post-Acute Status    Post-Acute Authorization Other     Other Status No Post-Acute Service Needs     Discharge Delays None known at this time               Patient discharged home with no needs.  Transportation arranged with [place] - non emergency Transportation service (Ref # [id number]).  Information given to him per medical staff for f/u and symptoms to notify provider.      Warner Vinson LMSW  [facility]  X [phone]            Important Message from Medicare             Contact Info     Adam Merchant MD   Specialty: Gastroenterology    [facility] and [address] [address]   Phone: [phone]       Next Steps: Follow up on 6/1/2022    Instructions: Paracentesis Wednesday 6/1 @ 10am    No, Primary Doctor   Relationship: PCP - General        Next Steps: Follow up

## 2022-05-25 NOTE — PLAN OF CARE
Patient AAO X 4, VSS. Denies pain, N/V  Skin intact. Lactulose given, patient had a couple of BM. Abdomen mildly distended. Up ambulatory, bed locked at lowest setting, yellow socks on, call bell in reach. Remains free from falls.

## 2022-05-26 LAB — BACTERIA SPEC AEROBE CULT: NO GROWTH

## 2022-05-27 LAB — BACTERIA SPEC ANAEROBE CULT: NORMAL

## 2022-05-31 LAB — BACTERIA SPEC ANAEROBE CULT: NORMAL

## 2022-06-11 NOTE — DISCHARGE SUMMARY
DISCHARGE SUMMARY  Hospital Medicine    Team: [facility]    Patient Name: Nic Munoz  YOB: 1965    Admit Date: 5/17/2022    Discharge Date: 05/25/22    Discharge Attending Physician: Zayda Guerrero      Admitting Resident    Diagnoses:  Active Hospital Problems    Diagnosis  POA    *Alcoholic cirrhosis of liver with ascites [K70.31]  Yes    Amphetamine use disorder, severe [F15.20]  Yes     Chronic    Alcohol use disorder, severe, in sustained remission [F10.21]  Yes     Chronic    IVDU (intravenous drug user) [F19.90]  No    BC (acute kidney injury) [N17.9]  Unknown    Ureteral stent retained [Z96.0]  Not Applicable    Esophageal varices without bleeding [I85.00]  Unknown    COPD not affecting current episode of care [J44.9]  Unknown    HTN (hypertension) [I10]  Yes    Chronic hepatitis B with cirrhosis [B18.1, K74.60]  Yes      Resolved Hospital Problems   No resolved problems to display.       Discharged Condition: admit problems have stabilized       HOSPITAL COURSE:      Initial Presentation:    55 y/o M hx of alcoholic cirrhosis, HTN, hepatitis B on antiviral therapy presents as a transfer to [facility] for hepatology eval for possible transplant. Pt comes from [place], [place]. Pt reports long history of cirrhosis; he states he has been receiving regular large-volume paracenteses for the past 10 months and he says they have been steadily increasing in frequency. He was referred last week to [facility] transplant team for a TIPS EVAL. He was evaluated in clinic day of admission with Dr. Yan and was deemed not a candidate for TIPS currently given MELD of 20. He underwent a paracentesis and then was directed to the [facility] ED for admission to the hospital for transplant workup. On interview pt reports feeling significantly better post-para. He denies any current acute complaints. He denies fever, chills, nausea, vomiting. Abdominal pain improved post-para. He does report significant LE  swelling.      In the ED, Pt HDS, afebrile, /74, HR 70s. CBC without leukocytosis, mild anemia with hgb 13.2, Plt 132. CMP notable for Na 131, Cr elevated to 1.7 (from baseline 1.0). Ammonia 70. INR 1.3. paracentesis labs pending at time of admission.         Overview/Hospital Course:  Patient admitted for management of BC, decompensated cirrhosis, and hepatology eval. Pt given lactulose and ctx for SBP prophylaxis. Midodrine, octreotide, albumin given for likely HRS. Patient continued on home Tenofovir for HBV. Hepatology consulted on admission. The following day patient's Cr improving. CTAP demonstrating nonobstructive renal calculi, no hydronephrosis. Paracentesis labs not c/w SBP, so Ctx discontinued. MELD improving. Will f/u hepatology recs. Pt taken off HRS protocol on 5/18 per hepatology. Hepatology will start tx workup. Anticipate transfer to [facility] team when space available.     Course of Principle Problem for Admission:    Alcoholic cirrhosis of liver with ascites     MELD-Na score: 18 at 5/24/2022  7:13 AM  MELD score: 12 at 5/24/2022  7:13 AM  Calculated from:  Serum Creatinine: 0.8 mg/dL (Using min of 1 mg/dL) at 5/24/2022  7:13 AM  Serum Sodium: 131 mmol/L at 5/24/2022  7:13 AM  Total Bilirubin: 2.4 mg/dL at 5/24/2022  7:13 AM  INR(ratio): 1.2 at 5/24/2022  7:13 AM  Age: 56 years  55 y/o M hx of alcoholic cirrhosis, Hep B cirrhosis, HTN presents from hepatology clinic with decompensated cirrhosis and for tx eval. Pt underwent paracentesis in clinic with labs sent. Patient afebrile, HDS and in no distress on admission. Hepatology consulted, appreciate their input.      Pt to be transferred to I[facility] when space available per hepatology. Will continue tx eval and workup.      Plan:  -- current meld 17  -- hepatology consulted on admission  -- lactulose 30 TID; titrate to 3 BM per day  -- discontinuing HRS protocol per hepatology on 5/18  -- will resume low-dose lasix, spironolactone on 5/19 am  -- D/C  ctx as low suspicion for SBP and para studies negative for SBP  -- US liver w/ doppler ordered  -- daily CBC, CMP, INR        Esophageal varices without bleeding  Last EGD 2/2022 at OSH:  Grade I varices were found in the lower third of the esophagus. Scarring noted from prior banding. Moderate portal hypertensive gastropathy was found in the entire examined stomach. Diffuse moderately erythematous mucosa without active bleeding and with   no stigmata of bleeding was found in the duodenal bulb.   Was recommended for Inderal LA 80mg daily, Lasix 40 BID, Aldactone 100mg BID, and Protonix 40mg daily  F/u 3 months and US portal vein doppler (not in CareEverywhere)                  - resuming low-dose lasix and spironolactone on 5/19           Ureteral stent retained           BC (acute kidney injury)  Patient with acute kidney injury likely d/t Pre-renal azotemia . Labs reviewed- Renal function/electrolytes with Estimated Creatinine Clearance: 59.6 mL/min (A) (based on SCr of 1.7 mg/dL (H)). according to latest data. Monitor urine output and serial BMP and adjust therapy as needed. Avoid nephrotoxins and renally dose meds for GFR listed above.      Concern for HRS given current decompensated liver cirrrhosis vs volume depletion s/o poor PO intake, though high suspicion at this time for HRS. On 5/18 pt Cr improving to 1.3, ok to discontinue HRS protocol per hepatology.      -- resume low-dose lasix, spironolactone on 5/19 am  -- discontinue albumin, octreotide, midodrine  -- avoid nephrotoxic medications  -- renally dose meds  -- CT AP with nonobstructive renal calculi, no hydronephrosis        Chronic hepatitis B with cirrhosis  -- continue Tenofovir 300 mg qd      CONSULTS: hepatology     Last CBC/BMP/HgbA1c (if applicable):  No results found for this or any previous visit (from the past 336 hour(s)).  No results found for this or any previous visit (from the past 336 hour(s)).  No results found for:  HGBA1C        Disposition:     Home with Home Health       Future Scheduled Appointments:  No future appointments.    Follow-up Plans from This Hospitalization:  Hepatology     Discharge Medication List:     Medication List      CHANGE how you take these medications    spironolactone 100 MG tablet  Commonly known as: ALDACTONE  Take 1 & 1/2 tablets (150 mg total) by mouth once daily.  What changed: how much to take        CONTINUE taking these medications    fluticasone furoate-vilanteroL 200-25 mcg/dose Dsdv diskus inhaler  Commonly known as: BREO     furosemide 40 MG tablet  Commonly known as: LASIX  Take 1 tablet (40 mg total) by mouth 2 (two) times daily.     lactulose 10 gram/15 mL solution  Commonly known as: CHRONULAC     oxybutynin 5 MG Tab  Commonly known as: DITROPAN     pantoprazole 40 MG tablet  Commonly known as: PROTONIX     SPIRIVA RESPIMAT 2.5 mcg/actuation inhaler  Generic drug: tiotropium bromide     SYMBICORT 160-4.5 mcg/actuation Hfaa  Generic drug: budesonide-formoterol 160-4.5 mcg     tenofovir disoproxil fumarate 300 mg Tab  Commonly known as: VIREAD        STOP taking these medications    cefdinir 300 MG capsule  Commonly known as: OMNICEF     predniSONE 20 MG tablet  Commonly known as: DELTASONE     ZYVOX 600 mg Tab  Generic drug: linezolid           Where to Get Your Medications      These medications were sent to [facility] [place], [place] - [address]  [address] [address]    Phone: [phone]   · furosemide 40 MG tablet  · spironolactone 100 MG tablet         Patient Instructions:  No discharge procedures on file.    Signing Physician:  Zayda Guerrero MD